# Patient Record
Sex: FEMALE | Race: WHITE | Employment: FULL TIME | URBAN - METROPOLITAN AREA
[De-identification: names, ages, dates, MRNs, and addresses within clinical notes are randomized per-mention and may not be internally consistent; named-entity substitution may affect disease eponyms.]

---

## 2022-05-12 ENCOUNTER — HOSPITAL ENCOUNTER (OUTPATIENT)
Dept: ULTRASOUND IMAGING | Age: 37
Discharge: HOME | End: 2022-05-12
Payer: COMMERCIAL

## 2022-05-12 ENCOUNTER — HOSPITAL ENCOUNTER (OUTPATIENT)
Dept: MAMMOGRAPHY | Age: 37
Discharge: HOME | End: 2022-05-12
Payer: COMMERCIAL

## 2022-05-12 VITALS — WEIGHT: 143 LBS | HEIGHT: 64 IN | BODY MASS INDEX: 24.41 KG/M2

## 2022-05-12 DIAGNOSIS — R92.8 ABNORMAL MAMMOGRAM OF RIGHT BREAST: ICD-10-CM

## 2022-05-12 DIAGNOSIS — N63.0 BREAST MASS: ICD-10-CM

## 2022-05-12 DIAGNOSIS — N63.0 BREAST LUMP: ICD-10-CM

## 2022-05-12 PROCEDURE — 88360 TUMOR IMMUNOHISTOCHEM/MANUAL: CPT

## 2022-05-12 PROCEDURE — 88342 IMHCHEM/IMCYTCHM 1ST ANTB: CPT

## 2022-05-12 PROCEDURE — 2709999900 US BREAST BIOPSY W LOC DEVICE 1ST LESION RIGHT

## 2022-05-12 PROCEDURE — 88305 TISSUE EXAM BY PATHOLOGIST: CPT

## 2022-05-12 PROCEDURE — 76642 ULTRASOUND BREAST LIMITED: CPT

## 2022-05-12 PROCEDURE — 38505 NEEDLE BIOPSY LYMPH NODES: CPT

## 2022-05-12 PROCEDURE — G0279 TOMOSYNTHESIS, MAMMO: HCPCS

## 2022-05-12 PROCEDURE — 77065 DX MAMMO INCL CAD UNI: CPT

## 2022-05-20 ENCOUNTER — HOSPITAL ENCOUNTER (OUTPATIENT)
Dept: NUCLEAR MEDICINE | Age: 37
Discharge: HOME | End: 2022-05-20
Payer: COMMERCIAL

## 2022-05-20 ENCOUNTER — HOSPITAL ENCOUNTER (OUTPATIENT)
Dept: MRI IMAGING | Age: 37
Discharge: HOME | End: 2022-05-20
Payer: COMMERCIAL

## 2022-05-20 ENCOUNTER — HOSPITAL ENCOUNTER (OUTPATIENT)
Dept: CT IMAGING | Age: 37
Discharge: HOME | End: 2022-05-20
Payer: COMMERCIAL

## 2022-05-20 DIAGNOSIS — C77.3 SECONDARY MALIGNANT NEOPLASM OF AXILLARY LYMPH NODES (HCC): ICD-10-CM

## 2022-05-20 DIAGNOSIS — C50.411 MALIGNANT NEOPLASM OF UPPER-OUTER QUADRANT OF RIGHT BREAST IN FEMALE, ESTROGEN RECEPTOR POSITIVE (HCC): ICD-10-CM

## 2022-05-20 DIAGNOSIS — C50.411 CARCINOMA OF UPPER-OUTER QUADRANT OF FEMALE BREAST, RIGHT (HCC): ICD-10-CM

## 2022-05-20 DIAGNOSIS — Z17.0 MALIGNANT NEOPLASM OF UPPER-OUTER QUADRANT OF RIGHT BREAST IN FEMALE, ESTROGEN RECEPTOR POSITIVE (HCC): ICD-10-CM

## 2022-05-20 PROCEDURE — A9503 TC99M MEDRONATE: HCPCS | Performed by: SURGERY

## 2022-05-20 PROCEDURE — 77049 MRI BREAST C-+ W/CAD BI: CPT

## 2022-05-20 PROCEDURE — 74177 CT ABD & PELVIS W/CONTRAST: CPT

## 2022-05-20 PROCEDURE — 6360000004 HC RX CONTRAST MEDICATION: Performed by: SURGERY

## 2022-05-20 PROCEDURE — 3430000000 HC RX DIAGNOSTIC RADIOPHARMACEUTICAL: Performed by: SURGERY

## 2022-05-20 PROCEDURE — 78306 BONE IMAGING WHOLE BODY: CPT

## 2022-05-20 PROCEDURE — A9579 GAD-BASE MR CONTRAST NOS,1ML: HCPCS | Performed by: SURGERY

## 2022-05-20 RX ORDER — TC 99M MEDRONATE 20 MG/10ML
25 INJECTION, POWDER, LYOPHILIZED, FOR SOLUTION INTRAVENOUS
Status: COMPLETED | OUTPATIENT
Start: 2022-05-20 | End: 2022-05-20

## 2022-05-20 RX ADMIN — TC 99M MEDRONATE 25.8 MILLICURIE: 20 INJECTION, POWDER, LYOPHILIZED, FOR SOLUTION INTRAVENOUS at 09:48

## 2022-05-20 RX ADMIN — IOPAMIDOL 75 ML: 755 INJECTION, SOLUTION INTRAVENOUS at 10:02

## 2022-05-20 RX ADMIN — GADOTERIDOL 13 ML: 279.3 INJECTION, SOLUTION INTRAVENOUS at 11:55

## 2022-05-24 ENCOUNTER — TELEPHONE (OUTPATIENT)
Dept: WOMENS IMAGING | Age: 37
End: 2022-05-24

## 2022-05-24 NOTE — TELEPHONE ENCOUNTER
[place]    [facility], [address] [place] [address]   Phone: [phone]         ULTRASOUND BIOPSY EDUCATION    NAME:  Chucky Olguin OF BIRTH:  1985   MEDICAL RECORD NUMBER:  [id number]   TODAY'S DATE:  5/24/2022    Referring Physician: Dr. Alex Hoffman    Procedure: U/S Core Bx    Left Breast    Date of biopsy: 5/31/22    Patient taking blood thinners: no    Medicine allergies: no    Special Instructions: Second look us prior to biopsy. Biopsy order form faxed to referring MD.          What is an Ultrasound Guided Breast Biopsy? Ultrasound guided breast biopsy is a test that uses ultrasound to find an area of your breast where a tissue sample will be taken. The sample is then looked at under a microscope to check for signs of breast cancer. Why is it done? An Ultrasound biopsy is usually done to check for cancer in a lump or cyst found during a mammogram or ultrasound. Preparing for the test?     * Take your medications as prescribed    You may eat and drink fluids before the test    Take a shower the evening or morning before the biopsy. What happens before the test?  Images are taken to find the exact site to be biopsied.   Your skin is washed with an alcohol prep.   You will be given an injection of medication to numb your breast.   What happens during the test?     Once your breast is numb, a small cut (incision) is made.   Using the imaging, the doctor will guide the needle into the biopsy area.   A sample of breast tissue is taken through the needle.   A small \"Clip\" or Marker is inserted into your breast to sebastián the biopsy site.   The needle is removed and pressure put on the needle site to stop any bleeding.   A bandage will be placed over the site.   A post mammogram picture will be taken to document the clip placement. How long does the test take? Approximately 60 minutes.   Most of the time is spent finding the area for the biopsy. What are the risks?   Bleeding: You may have some bleeding which can cause bruising, swelling,    or a bleeding under your skin.   Infection:  Signs of infection are redness, swelling, heat, or increasing pain    at the biopsy Site.   Sample size not adequate: This would require repeating the biopsy. What happens after the test?    The nurse will review your post biopsy instructions which include:         Placing an ice pack in your bra.    Wearing a firm fitting bra.    You may use Tylenol (Acetaminiphen) for discomfort. Lab results take about 2-3 business days. The Nurse Navigator or your doctor will call you with the results. Ultrasound Breast Biopsy:     (Please arrive 15 minutes early)                                                 [x] Blood thinner history reviewed with patient    [x] Take all your other medications on your normal routine schedule. [x] You may eat and drink as normal before your biopsy. [x] You may drive yourself. [x] No heavy lifting or exercise for 48 hours after the biopsy. [x] Printed Pre Ultrasound Biopsy Instructions were provided, reviewed with the patient and all questions were answered. The Breast Center's Information:   Should you experience any significant changes in your health or have questions about your care, please contact:  Patricia Marie, Breast Nurse Navigator at [facility], [facility]:  [phone]  Monday-Friday. If you need help with your care outside these hours and cannot wait until we are again available, contact your Physician or go to the hospital emergency room. [x] Patient/POA/Caregiver verbalized understanding of instructions.        Electronically signed by Patricia Marie RN on 5/24/2022 at 11:41 AM

## 2022-05-27 NOTE — PROGRESS NOTES
[facility] PRE-SURGICAL TESTING INSTRUCTIONS                                  PRIOR TO PROCEDURE DATE:        1. PLEASE FOLLOW ANY  GUIDELINES/ INSTRUCTIONS PRIOR TO YOUR PROCEDURE AS ADVISED BY YOUR SURGEON. 2. Arrange for someone to drive you home and be with you for the first 24 hours after discharge for your safety after your procedure for which you received sedation. Ensure it is someone we can share information with regarding your discharge. 3. You must contact your surgeon for instructions IF:   You are taking any blood thinners, aspirin, anti-inflammatory or vitamin E.   There is a change in your physical condition such as a cold, fever, rash, cuts, sores or any other infection, especially near your surgical site. 4. Do not drink alcohol the day before or day of your procedure. 5. A Pre-op History and Physical for surgery MUST be completed by your Physician or Urgent Care within 30 days of your procedure date. Please bring a copy with you on the day of your procedure and along with any other testing performed. THE DAY OF YOUR PROCEDURE:  1. Follow instructions for ARRIVAL TIME as DIRECTED BY YOUR SURGEON. 2. Enter the MAIN entrance from [address] [place] [address] and follow the signs to the free [employer] or [employer] parking (offered free of charge 6am-5pm). 3. Enter the Main Entrance of the hospital (do not enter from the lower level of the parking garage). Upon entrance, check in with the  at the main desk on your left. If no one is available at the desk, proceed into the [facility] Waiting Room and go through the door directly into the [facility]. There is a Check-in desk ACROSS from Room 5 (marked with a sign hanging from the ceiling). The phone number for the surgery center is [phone]. 4. Please call [phone] option #2 option #2 if you have not been preregistered yet.   On the day of your procedure bring your insurance card and photo ID. You will be registered at your bedside once brought back to your room. 5. DO NOT EAT ANYTHING eight hours prior to your arrival for surgery. May have 8 ounces of water 4 hours prior to your arrival for surgery. NOTE: ALL Gastric, Bariatric and Bowel surgery patients MUST follow their surgeon's instructions. 6. MEDICATIONS    Take the following medications with a SMALL sip of water: none   Bariatric patient's call surgeon if on diabetic medications as some need to be stopped 1 week preop   Use your usual dose of inhalers the morning of surgery. BRING your rescue inhaler with you to hospital.    Anesthesia does NOT want you to take insulin the morning of surgery. They will control your blood sugar while you are at the hospital. Please contact your ordering physician for instructions regarding your insulin the night before your procedure. If you have an insulin pump, please keep it set on basal rate. 7. Do not swallow water when brushing teeth. No gum, candy, mints or ice chips. Refrain from smoking or at least decrease the amount. 8. Dress in loose, comfortable clothing appropriate for redressing after your procedure. Do not wear jewelry (including body piercings), make-up (especially NO eye make-up), fingernail polish (NO toenail polish if foot/leg surgery), lotion, powders or metal hairclips. 9. Dentures, glasses, or contacts will need to be removed before your procedure. Bring cases for your glasses, contacts, dentures, or hearing aids to protect them while you are in surgery. 10. If you use a CPAP, please bring it with you on the day of your procedure. 11. We recommend that valuable personal  belongings such as cash, cell phones, e-tablets or jewelry, be left at home during your stay. The hospital will not be responsible for valuables that are not secured in the hospital safe.  However, if your insurance requires a co-pay, you may want to bring a method of payment, i.e. Check or credit card, if you wish to pay your co-pay the day of surgery. 12. If you are to stay overnight, you may bring a bag with personal items. Please have any large items you may need brought in by your family after your arrival to your hospital room. 15. If you have a Living Will or Durable Power of , please bring a copy on the day of your procedure. 15. With your permission, one family member may accompany you while you are being prepared for surgery. Once you are ready, additional family members may join you. HOW WE KEEP YOU SAFE and WORK TO PREVENT SURGICAL SITE INFECTIONS:  1. Health care workers should always check your ID bracelet to verify your name and birth date. You will be asked many times to state your name, date of birth, and allergies. 2. Health care workers should always clean their hands with soap or alcohol gel before providing care to you. It is okay to ask anyone if they cleaned their hands before they touch you. 3. You will be actively involved in verifying the type of procedure you are having and ensuring the correct surgical site. This will be confirmed multiple times prior to your procedure. Do NOT sebastián your surgery site UNLESS instructed to by your surgeon. 4. Do not shave or wax for 72 hours prior to procedure near your operative site. Shaving with a razor can irritate your skin and make it easier to develop an infection. On the day of your procedure, any hair that needs to be removed near the surgical site will be clipped by a healthcare worker using a special clippers designed to avoid skin irritation. 5. When you are in the operating room, your surgical site will be cleansed with a special soap, and in most cases, you will be given an antibiotic before the surgery begins. What to expect AFTER YOUR PROCEDURE:  1. Immediately following your procedure, your will be taken to the PACU for the first phase of your recovery.   Your nurse will help you recover from any potential side effects of anesthesia, such as extreme drowsiness, changes in your vital signs or breathing patterns. Nausea, headache, muscle aches, or sore throat may also occur after anesthesia. Your nurse will help you manage these potential side effects. 2. For comfort and safety, arrange to have someone at home with you for the first 24 hours after discharge. 3. You and your family will be given written instructions about your diet, activity, dressing care, medications, and return visits. 4. Once at home, should issues with nausea, pain, or bleeding occur, or should you notice any signs of infection, you should call your surgeon. 5. Always clean your hands before and after caring for your wound. Do not let your family touch your surgery site without cleaning their hands. 6. Narcotic pain medications can cause significant constipation. You may want to add a stool softener to your postoperative medication schedule or speak to your surgeon on how best to manage this SIDE EFFECT. SPECIAL INSTRUCTIONS     Thank you for allowing us to care for you. We strive to exceed your expectations in the delivery of care and service provided to you and your family. If you need to contact the [place] [address] staff for any reason, please call us at [phone]    Instructions reviewed with patient during preadmission testing phone interview.   Severo Bates RN.5/27/2022 .1:44 PM      ADDITIONAL EDUCATIONAL INFORMATION REVIEWED PER PHONE WITH YOU AND/OR YOUR FAMILY:  No Hibiclens® Bathing Instructions   Yes Antibacterial Soap

## 2022-05-27 NOTE — PROGRESS NOTES
Place patient label inside box (if no patient label, complete below)  Name:  :  MR#:   Elda Tran / PROCEDURE  1. I (we), Roxy Irina  (Patient Name) authorize Tamara Cooley MD (Provider / Noam Brothers) and/or such assistants as may be selected by him/her, to perform the following operation/procedure(s): LEFT PORT PLACEMENT        Note: If unable to obtain consent prior to an emergent procedure, document the emergent reason in the medical record. This procedure has been explained to my (our) satisfaction and included in the explanation was:  A) The intended benefit, nature, and extent of the procedure to be performed;  B) The significant risks involved and the probability of success;  C) Alternative procedures and methods of treatment;  D) The dangers and probable consequences of such alternatives (including no procedure or treatment); E) The expected consequences of the procedure on my future health;  F) Whether other qualified individuals would be performing important surgical tasks and/or whether  would be present to advise or support the procedure. I (we) understand that there are other risks of infection and other serious complications in the pre-operative/procedural and postoperative/procedural stages of my (our) care. I (we) have asked all of the questions which I (we) thought were important in deciding whether or not to undergo treatment or diagnosis. These questions have been answered to my (our) satisfaction. I (we) understand that no assurance can be given that the procedure will be a success, and no guarantee or warranty of success has been given to me (us). 2. It has been explained to me (us) that during the course of the operation/procedure, unforeseen conditions may be revealed that necessitate extension of the original procedure(s) or different procedure(s) than those set forth in Paragraph 1.  I (we) authorize and request that the above-named physician, his/her assistants or his/her designees, perform procedures as necessary and desirable if deemed to be in my (our) best interest.     Revised 8/2/2021                                                                          Page 1 of 2                   3. I acknowledge that health care personnel may be observing this procedure for the purpose of medical education or other specified purposes as may be necessary as requested and/or approved by my (our) physician. 4. I (we) consent to the disposal by the hospital Pathologist of the removed tissue, parts or organs in accordance with hospital policy. 5. I do ____ do not ____ consent to the use of a local infiltration pain blocking agent that will be used by my provider/surgical provider to help alleviate pain during my procedure. 6. I do ____ do not ____ consent to an emergent blood transfusion in the case of a life-threatening situation that requires blood components to be administered. This consent is valid for 24 hours from the beginning of the procedure. 7. This patient does ____ or does not ____ currently have a DNR status/order. If DNR order is in place, obtain Addendum to the Surgical Consent for ALL Patients with a DNR Order to address aileen-operative status for limited intervention or DNR suspension.      8. I have read and fully understand the above Consent for Operation/Procedure and that all blanks were completed before I signed the consent.   _____________________________       _____________________      ____/____am/pm  Signature of Patient or legal representative      Printed Name / Relationship            Date / Time   ____________________________       _____________________      ____/____am/pm  Witness to Signature                                    Printed Name                    Date / Time     If patient is unable to sign or is a minor, complete the following)  Patient is a minor, ____ years of age, or unable to sign because:   ______________________________________________________________________________________________    [place] If a phone consent is obtained, consent will be documented by using two health care professionals, each affirming that the consenting party has no questions and gives consent for the procedure discussed with the physician/provider.   _____________________          ____________________       _____/_____am/pm   2nd witness to phone consent        Printed name           Date / Time    Informed Consent:  I have provided the explanation described above in section 1 to the patient and/or legal representative.  I have provided the patient and/or legal representative with an opportunity to ask any questions about the proposed operation/procedure.   ___________________________          ____________________         ____/____am/pm  Provider / Proceduralist                            Printed name            Date / Time  Revised 8/2/2021                                                                      Page 2 of 2

## 2022-05-27 NOTE — PROGRESS NOTES
5/27/22 @ 1412 Pt verbalizes understanding of PAT instructions. [facility]   Anibal Dong

## 2022-05-31 ENCOUNTER — HOSPITAL ENCOUNTER (OUTPATIENT)
Dept: WOMENS IMAGING | Age: 37
End: 2022-05-31
Payer: COMMERCIAL

## 2022-05-31 ENCOUNTER — HOSPITAL ENCOUNTER (OUTPATIENT)
Dept: WOMENS IMAGING | Age: 37
Discharge: HOME | End: 2022-05-31
Payer: COMMERCIAL

## 2022-05-31 DIAGNOSIS — R92.8 ABNORMAL MRI, BREAST: ICD-10-CM

## 2022-05-31 PROCEDURE — 76642 ULTRASOUND BREAST LIMITED: CPT

## 2022-06-03 ENCOUNTER — ANESTHESIA EVENT (OUTPATIENT)
Dept: OPERATING ROOM | Age: 37
End: 2022-06-03
Payer: COMMERCIAL

## 2022-06-03 ENCOUNTER — APPOINTMENT (OUTPATIENT)
Dept: GENERAL RADIOLOGY | Age: 37
End: 2022-06-03
Attending: SURGERY
Payer: COMMERCIAL

## 2022-06-03 ENCOUNTER — ANESTHESIA (OUTPATIENT)
Dept: OPERATING ROOM | Age: 37
End: 2022-06-03
Payer: COMMERCIAL

## 2022-06-03 ENCOUNTER — HOSPITAL ENCOUNTER (OUTPATIENT)
Age: 37
Setting detail: OUTPATIENT SURGERY
Discharge: HOME | End: 2022-06-03
Attending: SURGERY | Admitting: SURGERY
Payer: COMMERCIAL

## 2022-06-03 VITALS
WEIGHT: 140.8 LBS | RESPIRATION RATE: 14 BRPM | HEART RATE: 74 BPM | HEIGHT: 64 IN | DIASTOLIC BLOOD PRESSURE: 64 MMHG | BODY MASS INDEX: 24.04 KG/M2 | SYSTOLIC BLOOD PRESSURE: 124 MMHG | OXYGEN SATURATION: 99 % | TEMPERATURE: 98.3 F

## 2022-06-03 DIAGNOSIS — G89.18 ACUTE POST-OPERATIVE PAIN: Primary | ICD-10-CM

## 2022-06-03 PROBLEM — I87.8 POOR VENOUS ACCESS: Status: ACTIVE | Noted: 2022-06-03

## 2022-06-03 LAB — PREGNANCY, URINE: NEGATIVE

## 2022-06-03 PROCEDURE — 2580000003 HC RX 258: Performed by: SURGERY

## 2022-06-03 PROCEDURE — 77001 FLUOROGUIDE FOR VEIN DEVICE: CPT

## 2022-06-03 PROCEDURE — 3700000001 HC ADD 15 MINUTES (ANESTHESIA): Performed by: SURGERY

## 2022-06-03 PROCEDURE — 6360000002 HC RX W HCPCS: Performed by: SURGERY

## 2022-06-03 PROCEDURE — 7100000010 HC PHASE II RECOVERY - FIRST 15 MIN: Performed by: SURGERY

## 2022-06-03 PROCEDURE — 3700000000 HC ANESTHESIA ATTENDED CARE: Performed by: SURGERY

## 2022-06-03 PROCEDURE — 7100000011 HC PHASE II RECOVERY - ADDTL 15 MIN: Performed by: SURGERY

## 2022-06-03 PROCEDURE — 3600000003 HC SURGERY LEVEL 3 BASE: Performed by: SURGERY

## 2022-06-03 PROCEDURE — 3600000013 HC SURGERY LEVEL 3 ADDTL 15MIN: Performed by: SURGERY

## 2022-06-03 PROCEDURE — 7100000000 HC PACU RECOVERY - FIRST 15 MIN: Performed by: SURGERY

## 2022-06-03 PROCEDURE — 84703 CHORIONIC GONADOTROPIN ASSAY: CPT

## 2022-06-03 PROCEDURE — C1788 PORT, INDWELLING, IMP: HCPCS | Performed by: SURGERY

## 2022-06-03 PROCEDURE — 2500000003 HC RX 250 WO HCPCS: Performed by: NURSE ANESTHETIST, CERTIFIED REGISTERED

## 2022-06-03 PROCEDURE — 6360000002 HC RX W HCPCS: Performed by: NURSE ANESTHETIST, CERTIFIED REGISTERED

## 2022-06-03 PROCEDURE — 2580000003 HC RX 258: Performed by: ANESTHESIOLOGY

## 2022-06-03 PROCEDURE — 2500000003 HC RX 250 WO HCPCS: Performed by: SURGERY

## 2022-06-03 PROCEDURE — 7100000001 HC PACU RECOVERY - ADDTL 15 MIN: Performed by: SURGERY

## 2022-06-03 PROCEDURE — 2709999900 HC NON-CHARGEABLE SUPPLY: Performed by: SURGERY

## 2022-06-03 PROCEDURE — A4217 STERILE WATER/SALINE, 500 ML: HCPCS | Performed by: SURGERY

## 2022-06-03 PROCEDURE — 71045 X-RAY EXAM CHEST 1 VIEW: CPT

## 2022-06-03 DEVICE — PORT INFUS 6FR SLIM POLYUR ATTCH OPN END SGL LUMN VEN CATH: Type: IMPLANTABLE DEVICE | Site: CHEST  WALL | Status: FUNCTIONAL

## 2022-06-03 RX ORDER — LABETALOL HYDROCHLORIDE 5 MG/ML
10 INJECTION, SOLUTION INTRAVENOUS
Status: DISCONTINUED | OUTPATIENT
Start: 2022-06-03 | End: 2022-06-03 | Stop reason: HOSPADM

## 2022-06-03 RX ORDER — SODIUM CHLORIDE 0.9 % (FLUSH) 0.9 %
5-40 SYRINGE (ML) INJECTION EVERY 12 HOURS SCHEDULED
Status: DISCONTINUED | OUTPATIENT
Start: 2022-06-03 | End: 2022-06-03 | Stop reason: HOSPADM

## 2022-06-03 RX ORDER — HEPARIN SODIUM (PORCINE) LOCK FLUSH IV SOLN 100 UNIT/ML 100 UNIT/ML
SOLUTION INTRAVENOUS PRN
Status: DISCONTINUED | OUTPATIENT
Start: 2022-06-03 | End: 2022-06-03 | Stop reason: ALTCHOICE

## 2022-06-03 RX ORDER — SODIUM CHLORIDE 9 MG/ML
INJECTION, SOLUTION INTRAVENOUS PRN
Status: DISCONTINUED | OUTPATIENT
Start: 2022-06-03 | End: 2022-06-03 | Stop reason: HOSPADM

## 2022-06-03 RX ORDER — GLYCOPYRROLATE 0.2 MG/ML
INJECTION INTRAMUSCULAR; INTRAVENOUS PRN
Status: DISCONTINUED | OUTPATIENT
Start: 2022-06-03 | End: 2022-06-03 | Stop reason: SDUPTHER

## 2022-06-03 RX ORDER — FENTANYL CITRATE 50 UG/ML
INJECTION, SOLUTION INTRAMUSCULAR; INTRAVENOUS PRN
Status: DISCONTINUED | OUTPATIENT
Start: 2022-06-03 | End: 2022-06-03 | Stop reason: SDUPTHER

## 2022-06-03 RX ORDER — ONDANSETRON 2 MG/ML
4 INJECTION INTRAMUSCULAR; INTRAVENOUS
Status: DISCONTINUED | OUTPATIENT
Start: 2022-06-03 | End: 2022-06-03 | Stop reason: HOSPADM

## 2022-06-03 RX ORDER — OXYCODONE HYDROCHLORIDE 5 MG/1
5 TABLET ORAL
Status: DISCONTINUED | OUTPATIENT
Start: 2022-06-03 | End: 2022-06-03 | Stop reason: HOSPADM

## 2022-06-03 RX ORDER — SODIUM CHLORIDE, SODIUM LACTATE, POTASSIUM CHLORIDE, CALCIUM CHLORIDE 600; 310; 30; 20 MG/100ML; MG/100ML; MG/100ML; MG/100ML
INJECTION, SOLUTION INTRAVENOUS CONTINUOUS
Status: DISCONTINUED | OUTPATIENT
Start: 2022-06-03 | End: 2022-06-03 | Stop reason: HOSPADM

## 2022-06-03 RX ORDER — MEPERIDINE HYDROCHLORIDE 25 MG/ML
12.5 INJECTION INTRAMUSCULAR; INTRAVENOUS; SUBCUTANEOUS EVERY 5 MIN PRN
Status: DISCONTINUED | OUTPATIENT
Start: 2022-06-03 | End: 2022-06-03 | Stop reason: HOSPADM

## 2022-06-03 RX ORDER — PROCHLORPERAZINE EDISYLATE 5 MG/ML
5 INJECTION INTRAMUSCULAR; INTRAVENOUS
Status: DISCONTINUED | OUTPATIENT
Start: 2022-06-03 | End: 2022-06-03 | Stop reason: HOSPADM

## 2022-06-03 RX ORDER — SODIUM CHLORIDE 0.9 % (FLUSH) 0.9 %
5-40 SYRINGE (ML) INJECTION PRN
Status: DISCONTINUED | OUTPATIENT
Start: 2022-06-03 | End: 2022-06-03 | Stop reason: HOSPADM

## 2022-06-03 RX ORDER — HYDROCODONE BITARTRATE AND ACETAMINOPHEN 5; 325 MG/1; MG/1
1 TABLET ORAL EVERY 6 HOURS PRN
Qty: 12 TABLET | Refills: 0 | Status: SHIPPED | OUTPATIENT
Start: 2022-06-03 | End: 2022-06-06

## 2022-06-03 RX ORDER — LIDOCAINE HYDROCHLORIDE 10 MG/ML
1 INJECTION, SOLUTION EPIDURAL; INFILTRATION; INTRACAUDAL; PERINEURAL
Status: DISCONTINUED | OUTPATIENT
Start: 2022-06-03 | End: 2022-06-03 | Stop reason: HOSPADM

## 2022-06-03 RX ORDER — PROPOFOL 10 MG/ML
INJECTION, EMULSION INTRAVENOUS CONTINUOUS PRN
Status: DISCONTINUED | OUTPATIENT
Start: 2022-06-03 | End: 2022-06-03 | Stop reason: SDUPTHER

## 2022-06-03 RX ORDER — MAGNESIUM HYDROXIDE 1200 MG/15ML
LIQUID ORAL CONTINUOUS PRN
Status: COMPLETED | OUTPATIENT
Start: 2022-06-03 | End: 2022-06-03

## 2022-06-03 RX ORDER — HYDRALAZINE HYDROCHLORIDE 20 MG/ML
10 INJECTION INTRAMUSCULAR; INTRAVENOUS
Status: DISCONTINUED | OUTPATIENT
Start: 2022-06-03 | End: 2022-06-03 | Stop reason: HOSPADM

## 2022-06-03 RX ORDER — MIDAZOLAM HYDROCHLORIDE 1 MG/ML
INJECTION INTRAMUSCULAR; INTRAVENOUS PRN
Status: DISCONTINUED | OUTPATIENT
Start: 2022-06-03 | End: 2022-06-03 | Stop reason: SDUPTHER

## 2022-06-03 RX ORDER — FENTANYL CITRATE 50 UG/ML
25 INJECTION, SOLUTION INTRAMUSCULAR; INTRAVENOUS EVERY 5 MIN PRN
Status: DISCONTINUED | OUTPATIENT
Start: 2022-06-03 | End: 2022-06-03 | Stop reason: HOSPADM

## 2022-06-03 RX ADMIN — CEFAZOLIN 2000 MG: 2 INJECTION, POWDER, FOR SOLUTION INTRAMUSCULAR; INTRAVENOUS at 11:07

## 2022-06-03 RX ADMIN — PROPOFOL 100 MCG/KG/MIN: 10 INJECTION, EMULSION INTRAVENOUS at 11:03

## 2022-06-03 RX ADMIN — GLYCOPYRROLATE 0.2 MG: 0.2 INJECTION INTRAMUSCULAR; INTRAVENOUS at 11:12

## 2022-06-03 RX ADMIN — MIDAZOLAM HYDROCHLORIDE 2 MG: 2 INJECTION, SOLUTION INTRAMUSCULAR; INTRAVENOUS at 10:54

## 2022-06-03 RX ADMIN — FENTANYL CITRATE 100 MCG: 50 INJECTION, SOLUTION INTRAMUSCULAR; INTRAVENOUS at 10:59

## 2022-06-03 RX ADMIN — PHENYLEPHRINE HYDROCHLORIDE 100 MCG: 10 INJECTION, SOLUTION INTRAMUSCULAR; INTRAVENOUS; SUBCUTANEOUS at 11:12

## 2022-06-03 RX ADMIN — SODIUM CHLORIDE, POTASSIUM CHLORIDE, SODIUM LACTATE AND CALCIUM CHLORIDE: 600; 310; 30; 20 INJECTION, SOLUTION INTRAVENOUS at 09:14

## 2022-06-03 ASSESSMENT — PAIN - FUNCTIONAL ASSESSMENT: PAIN_FUNCTIONAL_ASSESSMENT: 0-10

## 2022-06-03 ASSESSMENT — PAIN SCALES - GENERAL
PAINLEVEL_OUTOF10: 0

## 2022-06-03 NOTE — PROGRESS NOTES
Consents signed x 2 and procedures for the day explained to pt. Her  is nearby in their camper bus and has his phone. Dr. Anup Victor called and asked for a fluid bolus to be given after her IV start so it is infusing well.

## 2022-06-03 NOTE — PROGRESS NOTES
PACU Transfer to [facility]    Vitals:    06/03/22 1300   BP: 124/74   Pulse: 69   Resp: 18   Temp: 98 °F (36.7 °C)   SpO2: 100%         Intake/Output Summary (Last 24 hours) at 6/3/2022 1317  Last data filed at 6/3/2022 1308  Gross per 24 hour   Intake 516.41 ml   Output 10 ml   Net 506.41 ml       Pain assessment:  none  Pain Level: 0      Patient has all belongings at discharge from PACU. PACU RN called and updated patient's family.    Patient transferred to care of Lincoln Soto RN.    6/3/2022 1:17 PM

## 2022-06-03 NOTE — ANESTHESIA POSTPROCEDURE EVALUATION
Department of Anesthesiology  Postprocedure Note    Patient: Issa Newsome  MRN: [id number]  [place]: 1985  Date of evaluation: 6/3/2022  Time:  12:22 PM     Procedure Summary     Date: 06/03/22 Room / Location: [address] [place] [address] [address] [place] [address] / [facility]    Anesthesia Start: 8180 Anesthesia Stop: 0927    Procedure: LEFT SUBCLAVIAN PORT PLACEMENT (Left Chest) Diagnosis:       Poor venous access      (Poor venous access [I87.8])    Surgeons: Chinmay Patterson MD Responsible Provider: Suzanne Sim MD    Anesthesia Type: MAC ASA Status: 1          Anesthesia Type: No value filed. Merlyn Phase I: Merlyn Score: 10    Merlyn Phase II:      Last vitals: Reviewed and per EMR flowsheets.        Anesthesia Post Evaluation    Patient location during evaluation: PACU  Patient participation: complete - patient participated  Level of consciousness: awake and alert  Airway patency: patent  Nausea & Vomiting: no nausea and no vomiting  Complications: no  Cardiovascular status: hemodynamically stable  Respiratory status: acceptable  Hydration status: euvolemic  Multimodal analgesia pain management approach

## 2022-06-03 NOTE — PROGRESS NOTES
Ambulatory Surgery/Procedure Discharge Note    Vitals:    06/03/22 1320   BP: 124/64   Pulse: 74   Resp: 14   Temp: 98.3 °F (36.8 °C)   SpO2: 99%       In: 516.4 [P.O.:125; I.V.:341.4]  Out: 10 pt declined offer of drink or snack; Pt urinated X 1 in toilet    Restroom use offered before discharge. Yes -- voided X 1    Pain assessment:  none  Pain Level: 0    Pt returned to [facility] from PACU s/p left port placement. Pt fully alert; speech clear; breathing easily on RA; denies any pain or nausea. Left chest surgical site closed with surgical glue, and same is clean, dry and intact. This RN provided ice pack for pt to use. Pt declined any offer of snack or drink. Voided before discharge. Discharge instructions discussed with pt at bedside and with  via telephone, and both verbalized understanding, to include information regarding pt having a POWER PORT. IV removed, and dressing applied. Patient discharged to home/self care. Patient discharged via wheel chair by RN's to waiting family/S. O. in very large bus.        6/3/2022 1:56 PM

## 2022-06-03 NOTE — PROGRESS NOTES
Personally called and updated patient's  at [phone] on patient's status as well as plan moving forward for discharge.  is parked in the [employer] lot.

## 2022-06-03 NOTE — BRIEF OP NOTE
Brief Postoperative Note      Patient: Trell Cheng  YOB: 1985  MRN: [id number]    Date of Procedure: 6/3/2022    Pre-Op Diagnosis: Poor venous access [I87.8]    Post-Op Diagnosis: Same       Procedure(s):  LEFT SUBCLAVIAN PORT PLACEMENT    Surgeon(s):  Nohemy Peacock MD    Assistant:  Surgical Assistant: Rasheeda Wilson    Anesthesia: Monitor Anesthesia Care    Estimated Blood Loss (mL): less than 50     Complications: None    Specimens:   * No specimens in log *    Implants:  * No implants in log *      Drains: * No LDAs found *    Findings: tip in distal SVC    Electronically signed by Nohemy Peacock MD on 6/3/2022 at 11:47 AM

## 2022-06-03 NOTE — ANESTHESIA PRE PROCEDURE
Department of Anesthesiology  Preprocedure Note       Name:  Trell Cheng   Age:  29 y.o.  :  1985                                          MRN:  [id number]         Date:  6/3/2022      Surgeon: Jennyfer Lagos):  Nohemy Peacock MD    Procedure: Procedure(s):  LEFT PORT PLACEMENT    Medications prior to admission:   Prior to Admission medications    Not on File       Current medications:    Current Facility-Administered Medications   Medication Dose Route Frequency Provider Last Rate Last Admin    ceFAZolin (ANCEF) 2,000 mg in sodium chloride 0.9 % 50 mL IVPB (mini-bag)  2,000 mg IntraVENous Once Nohemy Peacock MD        lactated ringers infusion   IntraVENous Continuous Nohemy Peacock MD        lidocaine PF 1 % injection 1 mL  1 mL IntraDERmal Once PRN Shona Barrientos MD        lactated ringers infusion   IntraVENous Continuous Shona Barrientos  mL/hr at 22 0914 New Bag at 22 0914    sodium chloride flush 0.9 % injection 5-40 mL  5-40 mL IntraVENous 2 times per day Shona Barrientos MD        sodium chloride flush 0.9 % injection 5-40 mL  5-40 mL IntraVENous PRN Shona Barrientos MD        0.9 % sodium chloride infusion   IntraVENous PRN Shona Barrientos MD           Allergies:  No Known Allergies    Problem List:    Patient Active Problem List   Diagnosis Code    Poor venous access I87.8       Past Medical History:        Diagnosis Date    Cancer ([address] 2022    right breast       Past Surgical History:        Procedure Laterality Date    CHOLECYSTECTOMY  10-    US BREAST NEEDLE BIOPSY RIGHT Right 2022    US BREAST NEEDLE BIOPSY RIGHT 2022 [facility] ULTR[facility]    US LYMPH NODE BIOPSY  2022    US LYMPH NODE BIOPSY 2022 [facility] ULTRASOUND       Social History:    Social History     Tobacco Use    Smoking status: Never Smoker    Smokeless tobacco: Never Used   Substance Use Topics    Alcohol use:  No                                Counseling given: Not Answered      Vital Signs (Current):   Vitals:    05/27/22 1340 06/03/22 0842   BP:  137/72   Pulse:  86   Resp:  15   Temp:  98 °F (36.7 °C)   TempSrc:  Temporal   SpO2:  100%   Weight: 143 lb (64.9 kg) 140 lb 12.8 oz (63.9 kg)   Height: 5' 4\" (1.626 m) 5' 4\" (1.626 m)                                              BP Readings from Last 3 Encounters:   06/03/22 137/72       NPO Status: Time of last liquid consumption: 2030                        Time of last solid consumption: 2020                        Date of last liquid consumption: 06/02/22                        Date of last solid food consumption: 06/02/22    BMI:   Wt Readings from Last 3 Encounters:   06/03/22 140 lb 12.8 oz (63.9 kg)   05/20/22 143 lb (64.9 kg)   05/12/22 143 lb (64.9 kg)     Body mass index is 24.17 kg/m². CBC: No results found for: WBC, RBC, HGB, HCT, MCV, RDW, PLT    CMP: No results found for: NA, K, CL, CO2, BUN, CREATININE, GFRAA, AGRATIO, LABGLOM, GLUCOSE, GLU, PROT, CALCIUM, BILITOT, ALKPHOS, AST, ALT    POC Tests: No results for input(s): POCGLU, POCNA, POCK, POCCL, POCBUN, POCHEMO, POCHCT in the last 72 hours.     Coags: No results found for: PROTIME, INR, APTT    HCG (If Applicable):   Lab Results   Component Value Date    PREGTESTUR Negative 06/03/2022        ABGs: No results found for: PHART, PO2ART, XRX8CKF, ERH6YAQ, BEART, M2QVMJFR     Type & Screen (If Applicable):  No results found for: LABABO, LABRH    Drug/Infectious Status (If Applicable):  No results found for: HIV, HEPCAB    COVID-19 Screening (If Applicable): No results found for: COVID19        Anesthesia Evaluation  Patient summary reviewed and Nursing notes reviewed no history of anesthetic complications:   Airway: Mallampati: I  TM distance: >3 FB   Neck ROM: full  Mouth opening: > = 3 FB   Dental: normal exam         Pulmonary:Negative Pulmonary ROS and normal exam                               Cardiovascular:Negative CV ROS Neuro/Psych:   Negative Neuro/Psych ROS              GI/Hepatic/Renal: Neg GI/Hepatic/Renal ROS            Endo/Other: Negative Endo/Other ROS                    Abdominal:             Vascular: negative vascular ROS. Other Findings:           Anesthesia Plan      MAC     ASA 1       Induction: intravenous. Anesthetic plan and risks discussed with patient. Plan discussed with CRNA.     Attending anesthesiologist reviewed and agrees with Preprocedure content                Joann Miller MD   6/3/2022

## 2022-06-03 NOTE — H&P
Last H & P within the last 30 days. DIAGNOSIS:   Poor venous access [I87.8]    Procedure(s):  LEFT PORT PLACEMENT     History obtained from: Patient interview and EHR     HISTORY OF PRESENT ILLNESS:   Patient is a 29 y.o. female with breast cancer, and presents today for port placement in anticipation of planned treatment. Illness screening:  Patient denies recent fever, chills, worsening cough, or known sick exposure     Past Medical History:        Diagnosis Date    Cancer [facility])      Past Surgical History:        Procedure Laterality Date    CHOLECYSTECTOMY  10-    US BREAST NEEDLE BIOPSY RIGHT Right 5/12/2022    US BREAST NEEDLE BIOPSY RIGHT 5/12/2022 [address] [facility] ULTRASOUND    US LYMPH NODE BIOPSY  5/12/2022    US LYMPH NODE BIOPSY 5/12/2022 [address] [facility] ULTRASOUND       Medications Prior to Admission:      None. Allergies:  Patient has no known allergies. PHYSICAL EXAM:      /72   Pulse 86   Temp 98 °F (36.7 °C) (Temporal)   Resp 15   Ht 5' 4\" (1.626 m)   Wt 140 lb 12.8 oz (63.9 kg)   SpO2 100%   BMI 24.17 kg/m²      Airway:  Airway patent with no audible stridor. Heart:  Regular rate and rhythm. No murmur noted. Lungs:  No increased work of breathing, good air exchange, clear to auscultation bilaterally, no crackles or wheezing. Abdomen:  Soft, non-distended, non-tender, no rebound tenderness or guarding, and no masses palpated. ASSESSMENT AND PLAN     Patient is a 29 y.o. female with above specified procedure planned. 1.  The patients history and physical was obtained and signed off by the pre-admission testing department. Patient seen and focused exam done today- no new changes since last physical exam on 5/27/2022.    2.  Access to ancillary services are available per request of the provider.     EMMA Mccoy - CNP     6/3/2022

## 2022-06-03 NOTE — PROGRESS NOTES
Patient arrived to PACU post LEFT SUBCLAVIAN PORT PLACEMENT - Left withDr. Latesha Fu. DELMY on arrival. CRNA gave PACU RN report at bedside, stating no complications. Surgical site clean, dry and intact. Patient shows no signs of pain at this time. Will continue to monitor.

## 2022-06-04 NOTE — OP NOTE
[facility] [address], [address]                                OPERATIVE REPORT    PATIENT NAME: Bowen Gutierrez                       :        1985  MED REC NO:   [id number]                          ROOM:  ACCOUNT NO:   [de-identified]                           ADMIT DATE: 2022  PROVIDER:     Regina Hernandez MD    DATE OF PROCEDURE:  2022    PREOPERATIVE DIAGNOSIS:  Poor venous access. POSTOPERATIVE DIAGNOSIS:  Poor venous access. PROCEDURE:  Placement of left subclavian port, intraoperative fluoroscopy. SURGEON:  Regina Hernandez MD    ASSISTANT:  Tay James. BLOOD LOSS:  Less than 50 mL. COMPLICATIONS:  None. INDICATIONS:  The patient is a 81-year-old woman with a locally advanced  right breast cancer, who requires placement of a port for anticipated  chemotherapy. Prior to obtaining informed consent, the patient was  advised regarding the risk of pneumothorax. DESCRIPTION OF PROCEDURE:  The patient was taken to the operating room,  placed in the supine position. Shoulder roll was placed along the  spine. After administration of IV sedation, the patient's left chest  was prepped and draped in the usual sterile fashion. Field block was  performed using 2% lidocaine and 0.5% Marcaine mixed in equal parts. We  accessed the left subclavian vein on the first attempt and passed a  guidewire using Seldinger technique. Intraoperative fluoroscopy was  used to verify the location of the wire within the superior vena cava. We created a transverse incision at the entry point of the wire and  extended it through the subcutaneous tissues where we created a  subcutaneous pocket. Two 2-0 silk sutures were passed through the port  and the pectoralis fascia and left untied. A dilator sheath was passed  over the wire. The wire and the dilator were then withdrawn.   We passed  a catheter through the introducer sheath which was then torn away. After positioning the tip of the catheter in the superior vena cava, we  cut to the appropriate length and connected it to the port. The port  was secured in the subcutaneous pocket using two 2-0 silk sutures. The  incision was closed with interrupted deep dermal sutures and a 4-0  Monocryl subcuticular skin closure. The port was accessed with good  venous return and flushed with 20 mL of saline and 5 mL of heparin  flush. The port placed was a ClearView PowerPort 6-[place], reference  [id number], lot # [id number]. Postoperative chest x-ray confirmed the  presence of the catheter within the superior vena cava and the absence  of pneumothorax.         Summer Ortiz MD    D: 06/03/2022 21:02:27       T: 06/03/2022 21:06:01     [email or web address]  Job#: [id number]     Doc#: [id number]    CC:

## 2022-06-09 ENCOUNTER — HOSPITAL ENCOUNTER (OUTPATIENT)
Dept: NON INVASIVE DIAGNOSTICS | Age: 37
Discharge: HOME | End: 2022-06-09
Payer: COMMERCIAL

## 2022-06-09 DIAGNOSIS — Z51.11 MAINTENANCE CHEMOTHERAPY: ICD-10-CM

## 2022-06-09 LAB
LV EF: 63 %
LVEF MODALITY: NORMAL

## 2022-06-09 PROCEDURE — 93306 TTE W/DOPPLER COMPLETE: CPT

## 2022-09-29 ENCOUNTER — HOSPITAL ENCOUNTER (OUTPATIENT)
Dept: ULTRASOUND IMAGING | Age: 37
Discharge: HOME | End: 2022-09-29
Payer: COMMERCIAL

## 2022-09-29 ENCOUNTER — HOSPITAL ENCOUNTER (OUTPATIENT)
Dept: MAMMOGRAPHY | Age: 37
Discharge: HOME | End: 2022-09-29
Payer: COMMERCIAL

## 2022-09-29 VITALS — HEIGHT: 65 IN | BODY MASS INDEX: 20.83 KG/M2 | WEIGHT: 125 LBS

## 2022-09-29 DIAGNOSIS — N63.0 BREAST MASS: ICD-10-CM

## 2022-09-29 DIAGNOSIS — C50.411 MALIGNANT NEOPLASM OF UPPER-OUTER QUADRANT OF RIGHT FEMALE BREAST, UNSPECIFIED ESTROGEN RECEPTOR STATUS (HCC): ICD-10-CM

## 2022-09-29 DIAGNOSIS — Z09 FOLLOW UP: ICD-10-CM

## 2022-09-29 PROCEDURE — 76642 ULTRASOUND BREAST LIMITED: CPT

## 2022-09-29 PROCEDURE — 77065 DX MAMMO INCL CAD UNI: CPT

## 2022-09-29 PROCEDURE — 2709999900 US PLACE BREAST LOC DEVICE 1ST LESION RIGHT

## 2022-09-30 ENCOUNTER — TELEPHONE (OUTPATIENT)
Dept: SURGERY | Age: 37
End: 2022-09-30

## 2022-10-05 ENCOUNTER — OFFICE VISIT (OUTPATIENT)
Dept: SURGERY | Age: 37
End: 2022-10-05
Payer: COMMERCIAL

## 2022-10-05 VITALS
WEIGHT: 125.6 LBS | SYSTOLIC BLOOD PRESSURE: 130 MMHG | BODY MASS INDEX: 20.93 KG/M2 | OXYGEN SATURATION: 95 % | HEIGHT: 65 IN | DIASTOLIC BLOOD PRESSURE: 77 MMHG | TEMPERATURE: 98.3 F | HEART RATE: 84 BPM | RESPIRATION RATE: 15 BRPM

## 2022-10-05 DIAGNOSIS — C50.911 MALIGNANT NEOPLASM OF RIGHT FEMALE BREAST, UNSPECIFIED ESTROGEN RECEPTOR STATUS, UNSPECIFIED SITE OF BREAST (HCC): Primary | ICD-10-CM

## 2022-10-05 PROCEDURE — 99205 OFFICE O/P NEW HI 60 MIN: CPT | Performed by: SURGERY

## 2022-10-05 NOTE — LETTER
Surgery Schedule Request Form  [facility]  [address] [place] [address]. Mason Coombsbury: 11-     TIME OF SURGERY: 7:30 am      Confirmation#:__________________        Surgeon Name: Eunice Coley MD    Phone: [phone]     Fax: [phone]  Co-Case Additional Surgeon: Ghada Hannah MD  Procedure Name: 1) Bilateral breast reconstruction with direct to implant reconstruction with Alloderm           2) Possible stage 1 tissue expander placement with Alloderm   CPT CODES (required for scheduling): 72145, 05486 & 79511  DIAGNOSIS: C50.911  Breast Cancer    LENGTH OF PROCEDURE: 2 hours  Patient Status: 23 hour observation    Labs Needed:   CBC _x__  PT/PTT_x__ INR __x__  CMP _x__ EKG __x_   Urine Hcg ___            PATIENT NAME: Kimber Diaz            YOB: 1985  SEX: female   SS #:   PHONE: [phone] (home)     Pre-Op to be done by: PCP  Cardiac Clearance Done by: per PCP    Pt Position:  supine  Patient to meet with Anesthesiology prior to surgery: no     Medications to be stopped 5 days before surgery: anticoagulation     Ancef 2 gm IV OCTOR (NOTE:  If patient weight is > 120 kg, Administer 3 gm)  Other Orders: Transexemic acid 1g IV OCTOR; No Decadron; SA    INSURANCE: [facility]                                            SUBSCRIBER NAME: Pop Banks  MEMBER ID: [id number]                                      AUTHORIZATION #: I spoke with Juan Stephens at [facility] ([phone]) to see if CPT Code 26 [id number] or 34689 require pre certification. The codes do require pre certification, which I started during our call. All of the codes and diagnosis are valid and billable. Pre determination is not needed with a breast cancer diagnosis. Call Reference # [id number]    PCP: Clarisa Turcios MD                                       ANESTHESIA:  Luzma Rinaldi MD                             FAX TO: [phone]   QUESTIONS?  CALL: [phone]           [facility]   Fax   [phone]            Date Of Procedure: 2022    PATIENT:       Martha Fu                    :  1985      No Known Allergies    No.   PHYSICIAN ORDERS   HUC/  RN      ORDERS WITH CHECK BOXES MUST BE SELECTED. ALL OTHER ORDERS WILL BE AUTOMATICALLY INITIATED. Date / Time of Order:   10/6/22   12:35 PM          Procedure: Bilateral breast reconstruction with direct to implant reconstruction                      with Alloderm           Possible stage 1 tissue expander placement with Alloderm        1. Cefazolin (Ancef) 2 gm IV OCTOR   ** NOTE:  If patient weight is > 120 kg, Administer 3 gm         2. ** If PCN allergy, then Clindamycin 600mg IV OCTOR.   ** If prior history of MRSA, Vancomycin 1g IV OCTOR (please check with renal function prior to administration)       3.  Other orders: Transexemic acid 1g IV OCTOR; No Decadron; SA       Physician / Surgeon:  Tatianna Song MD  Office Ph:  [phone]       Physician Signature:

## 2022-10-05 NOTE — Clinical Note
I wish we could keep her nipples :(  Its going to go from an absolutely ridiculously stunning reconstruction to an average one :(

## 2022-10-05 NOTE — PROGRESS NOTES
[facility]    CC: Breast CA     Referring physician: Shahid Urrutia MD    HPI: This is a 29 y.o. female with a PMHx as delineated below who presents in consultation for breast reconstruction. She was found to have right breast cancer with metastasis to the lymph node and desires to proceed with bilateral mastectomy with reconstruction. Plastic surgery was consulted for evaluation and treatment. The specifics of her breast cancer workup / treatment is as follows:     Diagnosis: invasive ductal & PalB2  Mo/Yr Diagnosed: 5/12  Breast Involved:Right  Stage: 3  Nikki status: Positive  ER: Positive NM: Unknown HER2: Unknown    Oncologist: Dr. Afshan Gilmore MD  Radiation: None    Chemo/Meds: Completes neoadjuvant chemotherapy (estimated completion date 10/25/22)  Pregnancy/Miscarriages: 3 / 1    PMHx:   Past Medical History:   Diagnosis Date    Breast [facility] [address]     Cancer ([facility] 05/12/2022    right breast    Hx antineoplastic chemo      PSHx:   Past Surgical History:   Procedure Laterality Date    CHOLECYSTECTOMY  10-    PORT SURGERY Left 6/3/2022    LEFT SUBCLAVIAN PORT PLACEMENT performed by Steve Lantigua MD at [address] Right 5/12/2022    US BREAST NEEDLE BIOPSY RIGHT 5/12/2022 [address] [facility] ULTRASOUND    US GUIDED NEEDLE LOC OF RIGHT BREAST Right 9/29/2022    US GUIDED NEEDLE LOC OF RIGHT BREAST 9/29/2022 [address] [facility] ULTRASOUND    US LYMPH NODE BIOPSY  5/12/2022    US LYMPH NODE BIOPSY 5/12/2022 [address] M[facility] ULTRASOUND     Allergies: No Known Allergies  FHx: Past history of breast CA: Unsure, but believes yes  Meds:   No current outpatient medications on file. No current facility-administered medications for this visit. SocHx: Smoking: No    ETOH: none    Drug use: No    ROS   Constitutional: Negative for chills and fever. HENT: Negative for congestion, facial swelling, and voice change. Eyes: Negative for photophobia and visual disturbance. Respiratory: Negative for apnea, cough, chest tightness and shortness of breath. Cardiovascular: Negative for chest pain and palpitations. Gastrointestinal: Negative for dysphagia and early satiety. Genitourinary: Negative for difficulty urinating, dysuria, flank pain, frequency and hematuria. Musculoskeletal: Negative for new gait problem, joint swelling and myalgias. Skin: Negative for color change, pallor and rash. Endocrine: negative for tremors, temperature intolerance or polydipsia. Allergic/Immunologic: Negative for new environmental or food allergies. Neurological: Negative for dizziness, seizures, speech difficulty, numbness. Hematological: Negative for adenopathy. Psychiatric/Behavioral: Negative for agitation and confusion. EXAM  /77   Pulse 84   Temp 98.3 °F (36.8 °C) (Temporal)   Resp 15   Ht 5' 5\" (1.651 m)   Wt 125 lb 9.6 oz (57 kg)   SpO2 95%   BMI 20.90 kg/m²     GEN: NAD, pleasant, healthy  CVS: RRR  PULM: No respiratory distress  HEENT: PERRLA/EOMI; hearing appears within normal limits  NECK: Supple with trachea in midline, no masses  EXT: No lymphedema noted  ABD: soft/NT/ND  NEURO: No focal deficits, no obvious CN deficits  BACK: Bilateral latiss muscle intact  BREAST:   Physical Exam    Bra size: 36B  Desired bra size: \"C/D\"  Ptosis grade:   R: 1   L: 1  The left breast size is larger than the right breast.  There were no palpable masses with no palpable lymphadenopathy in the ipsilateral right axilla.    Nipple retraction: No   Bilateral nipple piercings  Port on the left upper chest    Left breast sternal notch to nipple: 20 cm  Right breast sternal notch to nipple: 19.1 cm    Left breast nipple to inframammary fold: 4.1 cm  Right breast nipple to inframammary fold: 4.6 cm    Left breast width 14 cm  Right breast width 13.1 cm    IMAGING: Reviewed    IMP: 29 y.o. female with breast cancer who presents for discussion regarding reconstruction options  PLAN: Given the location of the malignancy, will need removal of her nipple, and the patient desires to have both nipples removed. Thus, will approach via a vertical - possible Wise pattern - mastectomy with potential direct to implant reconstruction. Will work with Dr. Rafael Mcdowell and schedule. A discussion regarding surgical options including immediate vs delayed approaches, implant based vs autologous, as well as additional planned revisional surgeries was performed with the patient and family. Multiple autologous donor sites were discussed as well. Clinical photos were obtained. We additionally discussed the FDA recommendations regarding monitoring of silicone implants. The risks, benefits, alternatives, outcomes, personnel involved were discussed. Specifically, the risks including, but not limited to: bleeding that may necessitate transfusion or operation, infection, seroma, reoperation, nonhealing, poor cosmetic outcome, asymmetry, scarring, partial or total flap loss, donor site morbidity, VTE (DVT/PE), and death were discussed. All questions were answered in a satisfactory manner according to the patient. Total encounter time: 60 min with > 50% spent with face to face (or vitual) counseling and coordination of care.     Den Nicholas MD  [facility]  10/05/22

## 2022-10-06 ENCOUNTER — TELEPHONE (OUTPATIENT)
Dept: SURGERY | Age: 37
End: 2022-10-06

## 2022-10-06 NOTE — TELEPHONE ENCOUNTER
The patient was in the office to see Dr. Asia Tomas yesterday. PLAN: Given the location of the malignancy, will need removal of her nipple, and the patient desires to have both nipples removed. Thus, will approach via a vertical - possible Wise pattern - mastectomy with potential direct to implant reconstruction. Will work with Dr. Keerthi Alfonso and schedule. I received a surgery letter. I spoke with Cathyhiren Marlenramone at [facility] ([phone]) to see if CPT Code 03 5301 7984, 63613 or 02131 require pre certification. The codes do require pre certification, which I started during our call. All of the codes and diagnosis are valid and billable. Pre determination is not needed with a breast cancer diagnosis. Call Reference # [id number]    Time HELD 11- 7:30 am.     I spoke with the patient at the home number listed. The patient is now scheduled for surgery with  on 11-. The patient is aware of H&P    The patient is scheduled for her post op appointment 12-6-2022. I will submit the surgery letter today. I will fax the Alloderm order to [facility] today. I will scan the order and the fax success into Epic under the media tab. The patient will  the surgery information and instructions next week at her education appointment. I will close this phone note.

## 2022-10-14 ENCOUNTER — NURSE ONLY (OUTPATIENT)
Dept: SURGERY | Age: 37
End: 2022-10-14

## 2022-10-14 DIAGNOSIS — C50.911 MALIGNANT NEOPLASM OF RIGHT FEMALE BREAST, UNSPECIFIED ESTROGEN RECEPTOR STATUS, UNSPECIFIED SITE OF BREAST (HCC): Primary | ICD-10-CM

## 2022-10-14 NOTE — PROGRESS NOTES
[facility]  Breast Cancer Education    Patient: Bartolo Barajas  YOB: 1985    HPI: Bartolo Barajas is a 40 y.o. female who presents today for breast cancer education. She was found to have right breast cancer with metastasis to the lymph node and desires to proceed with bilateral mastectomy with reconstruction. Greater than 60 minutes was spent face to face with patient and  discussing preoperative and postoperative expectations, including wound care, surgical bra, drain care, medications, nutrition intake of increased protein and activity restrictions. Implant and tissue expander reconstruction was discussed , including TE fill timeline, implant surveillance and follow up. Chief Complaint   Patient presents with    Other     Breast Cancer Education       Plan: Kimber wishes to proceed with bilateral mastectomy with 1) Bilateral breast reconstruction with direct to implant reconstruction with Alloderm   2) Possible stage 1 tissue expander placement with Alloderm. Surgery is scheduled on 11/29/2022.          Alexandria Keith, BSN, RN [address] [place] [address] [facility] and Reconstructive Surgery  [phone]

## 2022-11-23 NOTE — PROGRESS NOTES
[facility] PRE-SURGICAL TESTING INSTRUCTIONS                      PRIOR TO PROCEDURE DATE:    1. PLEASE FOLLOW ANY INSTRUCTIONS GIVEN TO YOU PER YOUR SURGEON. 2. Arrange for someone to drive you home and be with you for the first 24 hours after discharge for your safety after your procedure for which you received sedation. Ensure it is someone we can share information with regarding your discharge. NOTE: At this time ONLY 2 ADULTS may accompany you   One person MUST stay at hospital entire time if outpatient surgery      3. You must contact your surgeon for instructions IF:  You are taking any blood thinners, aspirin, anti-inflammatory or vitamins. There is a change in your physical condition such as a cold, fever, rash, cuts, sores, or any other infection, especially near your surgical site. 4. Do not drink alcohol the day before or day of your procedure. Do not use any recreational marijuana at least 24 hours or street drugs (heroin, cocaine) at minimum 5 days prior to your procedure. 5. A Pre-Surgical History and Physical MUST be completed WITHIN 30 DAYS OR LESS prior to your procedure. by your Physician or an Urgent Care        THE DAY OF YOUR PROCEDURE:  1. Follow instructions for ARRIVAL TIME as DIRECTED BY YOUR SURGEON. 2. Enter the MAIN entrance from [employer] and follow the signs to the free Parking [employer] or [employer] (offered free of charge 7 am-5pm). 3. Enter the Main Entrance of the hospital (do not enter from the lower level of the parking garage). Upon entrance, check in with the  at the surgical information desk on your LEFT. Bring your insurance card and photo ID to register      4. DO NOT EAT ANYTHING 8 hours prior to arrival for surgery. You may have up to 8 ounces of water 4 hours prior to your arrival for surgery.    NOTE: ALL Gastric, Bariatric & Bowel surgery patients - you MUST follow your surgeon's instructions regarding eating/ drinking as you will have very specific instructions to follow. If you did not receive these, call your surgeon's office immediately. 5. MEDICATIONS:  Take the following medications with a SMALL sip of water:   Use your usual dose of inhalers the morning of surgery. BRING your rescue inhaler with you to hospital.   Anesthesia does NOT want you to take insulin the morning of surgery. They will control your blood sugar while you are at the hospital. Please contact your ordering physician for instructions regarding your insulin the night before your procedure. If you have an insulin pump, please keep it set on basal rate. Bariatric patient's call your surgeon if on diabetic medications as some may need to be stopped 1 week prior to surgery    6. Do not swallow additional water when brushing teeth. No gum, candy, mints, or ice chips. Refrain from smoking or at least decrease the amount on day of surgery. 7. Morning of surgery:   Take a shower with an antibacterial soap (i.e., Safeguard or Dial) OR your physician may have instructed you to use Hibiclens. Dress in loose, comfortable clothing appropriate for redressing after your procedure. Do not wear jewelry (including body piercings), make-up (especially NO eye make-up), fingernail polish (NO toenail polish if foot/leg surgery), lotion, powders, or metal hairclips. Do not shave or wax for 72 hours prior to procedure near your operative site. Shaving with a razor can irritate your skin and make it easier to develop an infection. On the day of your procedure, any hair that needs to be removed near the surgical site will be 'clipped' by a healthcare worker using a special clipper designed to avoid skin irritation. 8. Dentures, glasses, or contacts will need to be removed before your procedure. Bring cases for your glasses, contacts, dentures, or hearing aids to protect them while you are in surgery.       9. If you use a CPAP, please bring it with you on the day of your procedure. 10. We recommend that valuable personal belongings such as cash, cell phones, e-tablets, or jewelry, be left at home during your stay. The hospital will not be responsible for valuables that are not secured in the hospital safe. However, if your insurance requires a co-pay, you may want to bring a method of payment, i.e., Check or credit card, if you wish to pay your co-pay the day of surgery. 11. If you are to stay overnight, you may bring a bag with personal items. Please have any large items you may need brought in by your family after your arrival to your hospital room. 12. If you have a Living Will or Durable Power of , please bring a copy on the day of your procedure. How we keep you safe and work to prevent surgical site infections:   1. Health care workers should always check your ID bracelet to verify your name and birth date. You will be asked many times to state your name, date of birth, and allergies. 2. Health care workers should always clean their hands with soap or alcohol gel before providing care to you. It is okay to ask anyone if they cleaned their hands before they touch you. 3. You will be actively involved in verifying the type of procedure you are having and ensuring the correct surgical site. This will be confirmed multiple times prior to your procedure. Do NOT sebastián your surgery site UNLESS instructed to by your surgeon. 4. When you are in the operating room, your surgical site will be cleansed with a special soap, and in most cases, you will be given an antibiotic before the surgery begins. What to expect AFTER your procedure? 1. Immediately following your procedure, your will be taken to the PACU for the first phase of your recovery. Your nurse will help you recover from any potential side effects of anesthesia, such as extreme drowsiness, changes in your vital signs or breathing patterns.  Nausea, headache, muscle aches, or sore throat may also occur after anesthesia. Your nurse will help you manage these potential side effects. 2. For comfort and safety, arrange to have someone at home with you for the first 24 hours after discharge. 3. You and your family will be given written instructions about your diet, activity, dressing care, medications, and return visits. 4. Once at home, should issues with nausea, pain, or bleeding occur, or should you notice any signs of infection, you should call your surgeon. 5. Always clean your hands before and after caring for your wound. Do not let your family touch your surgery site without cleaning their hands. 6. Narcotic pain medications can cause significant constipation. You may want to add a stool softener to your postoperative medication schedule or speak to your surgeon on how best to manage this SIDE EFFECT. SPECIAL INSTRUCTIONS     Thank you for allowing us to care for you. We strive to exceed your expectations in the delivery of care and service provided to you and your family. If you need to contact the [place] [address] staff for any reason, please call us at [phone]    Instructions reviewed with patient during preadmission testing phone interview.   Geneva Andrade RN.11/23/2022 .1:46 PM      ADDITIONAL EDUCATIONAL INFORMATION REVIEWED PER PHONE WITH YOU AND/OR YOUR FAMILY:  No Hibiclens® Bathing Instructions   Yes Antibacterial Soap

## 2022-11-23 NOTE — PROGRESS NOTES
Place patient label inside box (if no patient label, complete below)  Name:  :  MR#:     Cysandhya Rain / PROCEDURE  I (we), Vijaya Muñoz  authorize DR Yusuf Dhillon  and/or such assistants as may be selected by him/her, to perform the following operation/procedure(s): BILATERAL BREAST RECONSTRUCTION WITH DIRECT TO IMPLANT RECONSTRUCTION WITH ALLODERM, POSSIBLE STAGE 1 TISSUE EXPANDER PLACEMENT WITH ALLODERM       Note: If unable to obtain consent prior to an emergent procedure, document the emergent reason in the medical record. This procedure has been explained to my (our) satisfaction and included in the explanation was: The intended benefit, nature, and extent of the procedure to be performed; The significant risks involved and the probability of success; Alternative procedures and methods of treatment; The dangers and probable consequences of such alternatives (including no procedure or treatment); The expected consequences of the procedure on my future health; Whether other qualified individuals would be performing important surgical tasks and/or whether  would be present to advise or support the procedure. I (we) understand that there are other risks of infection and other serious complications in the pre-operative/procedural and postoperative/procedural stages of my (our) care. I (we) have asked all of the questions which I (we) thought were important in deciding whether or not to undergo treatment or diagnosis. These questions have been answered to my (our) satisfaction. I (we) understand that no assurance can be given that the procedure will be a success, and no guarantee or warranty of success has been given to me (us).     It has been explained to me (us) that during the course of the operation/procedure, unforeseen conditions may be revealed that necessitate extension of the original procedure(s) or different procedure(s) than those set forth in Paragraph 1. I (we) authorize and request that the above-named physician, his/her assistants or his/her designees, perform procedures as necessary and desirable if deemed to be in my (our) best interest.     Revised 8/2/2021                                                                          Page 1 of 2           I acknowledge that health care personnel may be observing this procedure for the purpose of medical education or other specified purposes as may be necessary as requested and/or approved by my (our) physician. I (we) consent to the disposal by the hospital Pathologist of the removed tissue, parts or organs in accordance with hospital policy. I do ____ do not ____ consent to the use of a local infiltration pain blocking agent that will be used by my provider/surgical provider to help alleviate pain during my procedure. I do ____ do not ____ consent to an emergent blood transfusion in the case of a life-threatening situation that requires blood components to be administered. This consent is valid for 24 hours from the beginning of the procedure. This patient does ____ or does not ____ currently have a DNR status/order. If DNR order is in place, obtain Addendum to the Surgical Consent for ALL Patients with a DNR Order to address aileen-operative status for limited intervention or DNR suspension.      I have read and fully understand the above Consent for Operation/Procedure and that all blanks were completed before I signed the consent.   _____________________________       _____________________      ____/____am/pm  Signature of Patient or legal representative      Printed Name / Relationship            Date / Time   ____________________________       _____________________      ____/____am/pm  Witness to Signature                                    Printed Name                    Date / Time    If patient is unable to sign or is a minor, complete the following)  Patient is a minor, ____ years of age, or unable to sign because:   ______________________________________________________________________________________________    If a phone consent is obtained, consent will be documented by using two health care professionals, each affirming that the consenting party has no questions and gives consent for the procedure discussed with the physician/provider.   _____________________          ____________________       _____/_____am/pm   2nd witness to phone consent        Printed name           Date / Time    Informed Consent:  I have provided the explanation described above in section 1 to the patient and/or legal representative.  I have provided the patient and/or legal representative with an opportunity to ask any questions about the proposed operation/procedure.   ___________________________          ____________________         ____/____am/pm  Provider / Proceduralist                            Printed name            Date / Time  Revised 8/2/2021                                                                      Page 2 of 2

## 2022-11-23 NOTE — PROGRESS NOTES
Place patient label inside box (if no patient label, complete below)  Name:  :  MR#:     Mainor Friend / PROCEDURE  I (we), EDITA RIVERA   authorize DR Jesu White and/or such assistants as may be selected by him/her, to perform the following operation/procedure(s): RIGHT BREAST SENTINEL NODE BIOPSY (7:30)AM, BILATERAL SIMPLE MASTECTOMY, ULTRASOUND LATERALITY, RIGHT BREAST, 1 RADIOFREQUENCY TAG, AXILLA TARGET: LYMPH NODE CONTAINING BIOPSY MARKER (22)       Note: If unable to obtain consent prior to an emergent procedure, document the emergent reason in the medical record. This procedure has been explained to my (our) satisfaction and included in the explanation was: The intended benefit, nature, and extent of the procedure to be performed; The significant risks involved and the probability of success; Alternative procedures and methods of treatment; The dangers and probable consequences of such alternatives (including no procedure or treatment); The expected consequences of the procedure on my future health; Whether other qualified individuals would be performing important surgical tasks and/or whether  would be present to advise or support the procedure. I (we) understand that there are other risks of infection and other serious complications in the pre-operative/procedural and postoperative/procedural stages of my (our) care. I (we) have asked all of the questions which I (we) thought were important in deciding whether or not to undergo treatment or diagnosis. These questions have been answered to my (our) satisfaction. I (we) understand that no assurance can be given that the procedure will be a success, and no guarantee or warranty of success has been given to me (us).     It has been explained to me (us) that during the course of the operation/procedure, unforeseen conditions may be revealed that necessitate extension of the original procedure(s) or different procedure(s) than those set forth in Paragraph 1. I (we) authorize and request that the above-named physician, his/her assistants or his/her designees, perform procedures as necessary and desirable if deemed to be in my (our) best interest.     Revised 8/2/2021                                                                          Page 1 of 2           I acknowledge that health care personnel may be observing this procedure for the purpose of medical education or other specified purposes as may be necessary as requested and/or approved by my (our) physician. I (we) consent to the disposal by the hospital Pathologist of the removed tissue, parts or organs in accordance with hospital policy. I do ____ do not ____ consent to the use of a local infiltration pain blocking agent that will be used by my provider/surgical provider to help alleviate pain during my procedure. I do ____ do not ____ consent to an emergent blood transfusion in the case of a life-threatening situation that requires blood components to be administered. This consent is valid for 24 hours from the beginning of the procedure. This patient does ____ or does not ____ currently have a DNR status/order. If DNR order is in place, obtain Addendum to the Surgical Consent for ALL Patients with a DNR Order to address aileen-operative status for limited intervention or DNR suspension.      I have read and fully understand the above Consent for Operation/Procedure and that all blanks were completed before I signed the consent.   _____________________________       _____________________      ____/____am/pm  Signature of Patient or legal representative      Printed Name / Relationship            Date / Time   ____________________________       _____________________      ____/____am/pm  Witness to Signature                                    Printed Name                    Date / Time    If patient is unable to sign or is a minor, complete the following)  Patient is a minor, ____ years of age, or unable to sign because:   ______________________________________________________________________________________________    If a phone consent is obtained, consent will be documented by using two health care professionals, each affirming that the consenting party has no questions and gives consent for the procedure discussed with the physician/provider.   _____________________          ____________________       _____/_____am/pm   2nd witness to phone consent        Printed name           Date / Time    Informed Consent:  I have provided the explanation described above in section 1 to the patient and/or legal representative.  I have provided the patient and/or legal representative with an opportunity to ask any questions about the proposed operation/procedure.   ___________________________          ____________________         ____/____am/pm  Provider / Proceduralist                            Printed name            Date / Time  Revised 8/2/2021                                                                      Page 2 of 2

## 2022-11-28 ENCOUNTER — ANESTHESIA EVENT (OUTPATIENT)
Dept: OPERATING ROOM | Age: 37
End: 2022-11-28
Payer: COMMERCIAL

## 2022-11-28 PROBLEM — Z15.09 MONOALLELIC MUTATION OF PALB2 GENE: Status: ACTIVE | Noted: 2022-11-28

## 2022-11-28 PROBLEM — Z15.89 MONOALLELIC MUTATION OF PALB2 GENE: Status: ACTIVE | Noted: 2022-11-28

## 2022-11-28 PROBLEM — Z17.0 MALIGNANT NEOPLASM OF UPPER-OUTER QUADRANT OF RIGHT BREAST IN FEMALE, ESTROGEN RECEPTOR POSITIVE (HCC): Status: ACTIVE | Noted: 2022-11-28

## 2022-11-28 PROBLEM — C50.411 MALIGNANT NEOPLASM OF UPPER-OUTER QUADRANT OF RIGHT BREAST IN FEMALE, ESTROGEN RECEPTOR POSITIVE (HCC): Status: ACTIVE | Noted: 2022-11-28

## 2022-11-28 PROBLEM — Z15.01 MONOALLELIC MUTATION OF PALB2 GENE: Status: ACTIVE | Noted: 2022-11-28

## 2022-11-28 PROBLEM — Z92.21 HISTORY OF CHEMOTHERAPY: Status: ACTIVE | Noted: 2022-11-28

## 2022-11-28 NOTE — PROGRESS NOTES
11/28/22 @ 0919 Left message at office at PCP office for note confirming EKG. Darren Aguilerashaw    11/28/22 @ [phone] Spoke with office nurse and Sunday Marisela NP is out of office and Dr Harish Sweet is not available today.   Kavita Avendaño

## 2022-11-29 ENCOUNTER — APPOINTMENT (OUTPATIENT)
Dept: MAMMOGRAPHY | Age: 37
End: 2022-11-29
Attending: SURGERY
Payer: COMMERCIAL

## 2022-11-29 ENCOUNTER — ANESTHESIA (OUTPATIENT)
Dept: OPERATING ROOM | Age: 37
End: 2022-11-29
Payer: COMMERCIAL

## 2022-11-29 ENCOUNTER — HOSPITAL ENCOUNTER (OUTPATIENT)
Dept: NUCLEAR MEDICINE | Age: 37
Discharge: HOME | End: 2022-11-29
Attending: SURGERY
Payer: COMMERCIAL

## 2022-11-29 ENCOUNTER — HOSPITAL ENCOUNTER (OUTPATIENT)
Age: 37
Setting detail: OUTPATIENT SURGERY
Discharge: HOME | End: 2022-11-29
Attending: SURGERY | Admitting: SURGERY
Payer: COMMERCIAL

## 2022-11-29 VITALS
SYSTOLIC BLOOD PRESSURE: 128 MMHG | BODY MASS INDEX: 20.26 KG/M2 | HEART RATE: 81 BPM | RESPIRATION RATE: 16 BRPM | HEIGHT: 65 IN | TEMPERATURE: 97.7 F | DIASTOLIC BLOOD PRESSURE: 79 MMHG | WEIGHT: 121.6 LBS | OXYGEN SATURATION: 98 %

## 2022-11-29 DIAGNOSIS — Z15.02 PALB2-RELATED BREAST CANCER (HCC): ICD-10-CM

## 2022-11-29 DIAGNOSIS — Z15.89 MONOALLELIC MUTATION OF NF1 GENE: ICD-10-CM

## 2022-11-29 DIAGNOSIS — Z15.09 PALB2-RELATED BREAST CANCER (HCC): ICD-10-CM

## 2022-11-29 DIAGNOSIS — C77.3 SECONDARY MALIGNANT NEOPLASM OF AXILLARY LYMPH NODES (HCC): ICD-10-CM

## 2022-11-29 DIAGNOSIS — C50.411 CARCINOMA OF UPPER-OUTER QUADRANT OF FEMALE BREAST, RIGHT (HCC): ICD-10-CM

## 2022-11-29 DIAGNOSIS — Z15.89 PALB2-RELATED BREAST CANCER (HCC): ICD-10-CM

## 2022-11-29 DIAGNOSIS — C77.3 CARCINOMA OF RIGHT BREAST METASTATIC TO AXILLARY LYMPH NODE (HCC): ICD-10-CM

## 2022-11-29 DIAGNOSIS — C50.911 CARCINOMA OF RIGHT BREAST METASTATIC TO AXILLARY LYMPH NODE (HCC): ICD-10-CM

## 2022-11-29 DIAGNOSIS — G89.18 POST-OP PAIN: Primary | ICD-10-CM

## 2022-11-29 DIAGNOSIS — C50.911 MALIGNANT NEOPLASM OF RIGHT FEMALE BREAST, UNSPECIFIED ESTROGEN RECEPTOR STATUS, UNSPECIFIED SITE OF BREAST (HCC): ICD-10-CM

## 2022-11-29 DIAGNOSIS — C50.919 PALB2-RELATED BREAST CANCER (HCC): ICD-10-CM

## 2022-11-29 LAB — PREGNANCY, URINE: NEGATIVE

## 2022-11-29 PROCEDURE — 2500000003 HC RX 250 WO HCPCS: Performed by: NURSE ANESTHETIST, CERTIFIED REGISTERED

## 2022-11-29 PROCEDURE — 88307 TISSUE EXAM BY PATHOLOGIST: CPT

## 2022-11-29 PROCEDURE — 3700000001 HC ADD 15 MINUTES (ANESTHESIA): Performed by: SURGERY

## 2022-11-29 PROCEDURE — 2580000003 HC RX 258: Performed by: ANESTHESIOLOGY

## 2022-11-29 PROCEDURE — 15860 IV NJX TST VASC FLO FLAP/GRF: CPT | Performed by: SURGERY

## 2022-11-29 PROCEDURE — 15777 ACELLULAR DERM MATRIX IMPLT: CPT | Performed by: SURGERY

## 2022-11-29 PROCEDURE — A4217 STERILE WATER/SALINE, 500 ML: HCPCS | Performed by: SURGERY

## 2022-11-29 PROCEDURE — 2709999900 HC NON-CHARGEABLE SUPPLY: Performed by: SURGERY

## 2022-11-29 PROCEDURE — 7100000010 HC PHASE II RECOVERY - FIRST 15 MIN: Performed by: SURGERY

## 2022-11-29 PROCEDURE — A9520 TC99 TILMANOCEPT DIAG 0.5MCI: HCPCS | Performed by: SURGERY

## 2022-11-29 PROCEDURE — 76098 X-RAY EXAM SURGICAL SPECIMEN: CPT

## 2022-11-29 PROCEDURE — 3600000014 HC SURGERY LEVEL 4 ADDTL 15MIN: Performed by: SURGERY

## 2022-11-29 PROCEDURE — 88341 IMHCHEM/IMCYTCHM EA ADD ANTB: CPT

## 2022-11-29 PROCEDURE — 2500000003 HC RX 250 WO HCPCS: Performed by: SURGERY

## 2022-11-29 PROCEDURE — 2580000003 HC RX 258: Performed by: SURGERY

## 2022-11-29 PROCEDURE — 19340 INSJ BREAST IMPLT SM D MAST: CPT | Performed by: SURGERY

## 2022-11-29 PROCEDURE — 6360000002 HC RX W HCPCS: Performed by: NURSE ANESTHETIST, CERTIFIED REGISTERED

## 2022-11-29 PROCEDURE — C9290 INJ, BUPIVACAINE LIPOSOME: HCPCS | Performed by: SURGERY

## 2022-11-29 PROCEDURE — C1729 CATH, DRAINAGE: HCPCS | Performed by: SURGERY

## 2022-11-29 PROCEDURE — 6360000002 HC RX W HCPCS: Performed by: ANESTHESIOLOGY

## 2022-11-29 PROCEDURE — 3600000004 HC SURGERY LEVEL 4 BASE: Performed by: SURGERY

## 2022-11-29 PROCEDURE — 88342 IMHCHEM/IMCYTCHM 1ST ANTB: CPT

## 2022-11-29 PROCEDURE — 7100000001 HC PACU RECOVERY - ADDTL 15 MIN: Performed by: SURGERY

## 2022-11-29 PROCEDURE — 88331 PATH CONSLTJ SURG 1 BLK 1SPC: CPT

## 2022-11-29 PROCEDURE — 7100000011 HC PHASE II RECOVERY - ADDTL 15 MIN: Performed by: SURGERY

## 2022-11-29 PROCEDURE — 3700000000 HC ANESTHESIA ATTENDED CARE: Performed by: SURGERY

## 2022-11-29 PROCEDURE — 6360000002 HC RX W HCPCS: Performed by: SURGERY

## 2022-11-29 PROCEDURE — 84703 CHORIONIC GONADOTROPIN ASSAY: CPT

## 2022-11-29 PROCEDURE — 2720000010 HC SURG SUPPLY STERILE: Performed by: SURGERY

## 2022-11-29 PROCEDURE — 78195 LYMPH SYSTEM IMAGING: CPT

## 2022-11-29 PROCEDURE — 3430000000 HC RX DIAGNOSTIC RADIOPHARMACEUTICAL: Performed by: SURGERY

## 2022-11-29 PROCEDURE — C1789 PROSTHESIS, BREAST, IMP: HCPCS | Performed by: SURGERY

## 2022-11-29 PROCEDURE — 6360000002 HC RX W HCPCS

## 2022-11-29 PROCEDURE — 7100000000 HC PACU RECOVERY - FIRST 15 MIN: Performed by: SURGERY

## 2022-11-29 DEVICE — IMPLANTABLE DEVICE: Type: IMPLANTABLE DEVICE | Site: BREAST | Status: FUNCTIONAL

## 2022-11-29 DEVICE — GRAFT HUM TISS THK2-2.8MM THCK M PERF CNTOUR ACELLULAR DERM: Type: IMPLANTABLE DEVICE | Site: BREAST | Status: FUNCTIONAL

## 2022-11-29 RX ORDER — OXYCODONE HYDROCHLORIDE 5 MG/1
5 TABLET ORAL EVERY 6 HOURS PRN
Qty: 12 TABLET | Refills: 0 | Status: SHIPPED | OUTPATIENT
Start: 2022-11-29 | End: 2022-12-04

## 2022-11-29 RX ORDER — PROCHLORPERAZINE EDISYLATE 5 MG/ML
5 INJECTION INTRAMUSCULAR; INTRAVENOUS
Status: DISCONTINUED | OUTPATIENT
Start: 2022-11-29 | End: 2022-11-29 | Stop reason: HOSPADM

## 2022-11-29 RX ORDER — LABETALOL HYDROCHLORIDE 5 MG/ML
10 INJECTION, SOLUTION INTRAVENOUS
Status: DISCONTINUED | OUTPATIENT
Start: 2022-11-29 | End: 2022-11-29 | Stop reason: HOSPADM

## 2022-11-29 RX ORDER — SODIUM CHLORIDE, SODIUM LACTATE, POTASSIUM CHLORIDE, CALCIUM CHLORIDE 600; 310; 30; 20 MG/100ML; MG/100ML; MG/100ML; MG/100ML
INJECTION, SOLUTION INTRAVENOUS CONTINUOUS
Status: DISCONTINUED | OUTPATIENT
Start: 2022-11-29 | End: 2022-11-29 | Stop reason: HOSPADM

## 2022-11-29 RX ORDER — FENTANYL CITRATE 50 UG/ML
INJECTION, SOLUTION INTRAMUSCULAR; INTRAVENOUS PRN
Status: DISCONTINUED | OUTPATIENT
Start: 2022-11-29 | End: 2022-11-29 | Stop reason: SDUPTHER

## 2022-11-29 RX ORDER — GLYCOPYRROLATE 0.2 MG/ML
INJECTION INTRAMUSCULAR; INTRAVENOUS PRN
Status: DISCONTINUED | OUTPATIENT
Start: 2022-11-29 | End: 2022-11-29 | Stop reason: SDUPTHER

## 2022-11-29 RX ORDER — FAMOTIDINE 10 MG/ML
INJECTION, SOLUTION INTRAVENOUS PRN
Status: DISCONTINUED | OUTPATIENT
Start: 2022-11-29 | End: 2022-11-29 | Stop reason: SDUPTHER

## 2022-11-29 RX ORDER — HYDROMORPHONE HCL 110MG/55ML
PATIENT CONTROLLED ANALGESIA SYRINGE INTRAVENOUS PRN
Status: DISCONTINUED | OUTPATIENT
Start: 2022-11-29 | End: 2022-11-29 | Stop reason: SDUPTHER

## 2022-11-29 RX ORDER — OXYCODONE HYDROCHLORIDE 5 MG/1
5 TABLET ORAL
Status: DISCONTINUED | OUTPATIENT
Start: 2022-11-29 | End: 2022-11-29 | Stop reason: HOSPADM

## 2022-11-29 RX ORDER — SODIUM CHLORIDE 0.9 % (FLUSH) 0.9 %
5-40 SYRINGE (ML) INJECTION EVERY 12 HOURS SCHEDULED
Status: DISCONTINUED | OUTPATIENT
Start: 2022-11-29 | End: 2022-11-29 | Stop reason: HOSPADM

## 2022-11-29 RX ORDER — MEPERIDINE HYDROCHLORIDE 25 MG/ML
12.5 INJECTION INTRAMUSCULAR; INTRAVENOUS; SUBCUTANEOUS EVERY 5 MIN PRN
Status: DISCONTINUED | OUTPATIENT
Start: 2022-11-29 | End: 2022-11-29 | Stop reason: HOSPADM

## 2022-11-29 RX ORDER — HYDRALAZINE HYDROCHLORIDE 20 MG/ML
10 INJECTION INTRAMUSCULAR; INTRAVENOUS
Status: DISCONTINUED | OUTPATIENT
Start: 2022-11-29 | End: 2022-11-29 | Stop reason: HOSPADM

## 2022-11-29 RX ORDER — MIDAZOLAM HYDROCHLORIDE 1 MG/ML
INJECTION INTRAMUSCULAR; INTRAVENOUS PRN
Status: DISCONTINUED | OUTPATIENT
Start: 2022-11-29 | End: 2022-11-29 | Stop reason: SDUPTHER

## 2022-11-29 RX ORDER — ONDANSETRON 2 MG/ML
INJECTION INTRAMUSCULAR; INTRAVENOUS PRN
Status: DISCONTINUED | OUTPATIENT
Start: 2022-11-29 | End: 2022-11-29 | Stop reason: SDUPTHER

## 2022-11-29 RX ORDER — LIDOCAINE HYDROCHLORIDE 20 MG/ML
INJECTION, SOLUTION INTRAVENOUS PRN
Status: DISCONTINUED | OUTPATIENT
Start: 2022-11-29 | End: 2022-11-29 | Stop reason: SDUPTHER

## 2022-11-29 RX ORDER — CEPHALEXIN 500 MG/1
500 CAPSULE ORAL 4 TIMES DAILY
Qty: 40 CAPSULE | Refills: 0 | Status: SHIPPED | OUTPATIENT
Start: 2022-11-29 | End: 2022-12-09

## 2022-11-29 RX ORDER — ROCURONIUM BROMIDE 10 MG/ML
INJECTION, SOLUTION INTRAVENOUS PRN
Status: DISCONTINUED | OUTPATIENT
Start: 2022-11-29 | End: 2022-11-29 | Stop reason: SDUPTHER

## 2022-11-29 RX ORDER — ISOSULFAN BLUE 50 MG/5ML
INJECTION, SOLUTION SUBCUTANEOUS PRN
Status: DISCONTINUED | OUTPATIENT
Start: 2022-11-29 | End: 2022-11-29 | Stop reason: HOSPADM

## 2022-11-29 RX ORDER — SODIUM CHLORIDE 0.9 % (FLUSH) 0.9 %
5-40 SYRINGE (ML) INJECTION PRN
Status: DISCONTINUED | OUTPATIENT
Start: 2022-11-29 | End: 2022-11-29 | Stop reason: HOSPADM

## 2022-11-29 RX ORDER — ONDANSETRON 2 MG/ML
4 INJECTION INTRAMUSCULAR; INTRAVENOUS ONCE
Status: DISCONTINUED | OUTPATIENT
Start: 2022-11-29 | End: 2022-11-29 | Stop reason: HOSPADM

## 2022-11-29 RX ORDER — SODIUM CHLORIDE 9 MG/ML
INJECTION, SOLUTION INTRAVENOUS PRN
Status: DISCONTINUED | OUTPATIENT
Start: 2022-11-29 | End: 2022-11-29 | Stop reason: HOSPADM

## 2022-11-29 RX ORDER — ONDANSETRON 2 MG/ML
4 INJECTION INTRAMUSCULAR; INTRAVENOUS
Status: COMPLETED | OUTPATIENT
Start: 2022-11-29 | End: 2022-11-29

## 2022-11-29 RX ORDER — ONDANSETRON 2 MG/ML
INJECTION INTRAMUSCULAR; INTRAVENOUS
Status: COMPLETED
Start: 2022-11-29 | End: 2022-11-29

## 2022-11-29 RX ORDER — PROPOFOL 10 MG/ML
INJECTION, EMULSION INTRAVENOUS PRN
Status: DISCONTINUED | OUTPATIENT
Start: 2022-11-29 | End: 2022-11-29 | Stop reason: SDUPTHER

## 2022-11-29 RX ADMIN — HYDROMORPHONE HYDROCHLORIDE 0.6 MG: 2 INJECTION, SOLUTION INTRAMUSCULAR; INTRAVENOUS; SUBCUTANEOUS at 07:55

## 2022-11-29 RX ADMIN — SODIUM CHLORIDE, POTASSIUM CHLORIDE, SODIUM LACTATE AND CALCIUM CHLORIDE: 600; 310; 30; 20 INJECTION, SOLUTION INTRAVENOUS at 12:15

## 2022-11-29 RX ADMIN — ONDANSETRON 4 MG: 2 INJECTION INTRAMUSCULAR; INTRAVENOUS at 12:18

## 2022-11-29 RX ADMIN — ONDANSETRON 4 MG: 2 INJECTION INTRAMUSCULAR; INTRAVENOUS at 07:41

## 2022-11-29 RX ADMIN — GLYCOPYRROLATE 0.1 MG: 0.2 INJECTION INTRAMUSCULAR; INTRAVENOUS at 07:35

## 2022-11-29 RX ADMIN — ONDANSETRON 4 MG: 2 INJECTION INTRAMUSCULAR; INTRAVENOUS at 11:04

## 2022-11-29 RX ADMIN — TILMANOCEPT 0.82 MILLICURIE: KIT at 08:02

## 2022-11-29 RX ADMIN — CEFAZOLIN 2000 MG: 2 INJECTION, POWDER, FOR SOLUTION INTRAMUSCULAR; INTRAVENOUS at 07:27

## 2022-11-29 RX ADMIN — HYDROMORPHONE HYDROCHLORIDE 0.4 MG: 2 INJECTION, SOLUTION INTRAMUSCULAR; INTRAVENOUS; SUBCUTANEOUS at 09:13

## 2022-11-29 RX ADMIN — ROCURONIUM BROMIDE 50 MG: 10 INJECTION INTRAVENOUS at 07:32

## 2022-11-29 RX ADMIN — FAMOTIDINE 20 MG: 10 INJECTION, SOLUTION INTRAVENOUS at 07:27

## 2022-11-29 RX ADMIN — SUGAMMADEX 200 MG: 100 INJECTION, SOLUTION INTRAVENOUS at 10:05

## 2022-11-29 RX ADMIN — TRANEXAMIC ACID 1000 MG: 1 INJECTION, SOLUTION INTRAVENOUS at 07:55

## 2022-11-29 RX ADMIN — SODIUM CHLORIDE, POTASSIUM CHLORIDE, SODIUM LACTATE AND CALCIUM CHLORIDE: 600; 310; 30; 20 INJECTION, SOLUTION INTRAVENOUS at 06:50

## 2022-11-29 RX ADMIN — FENTANYL CITRATE 100 MCG: 50 INJECTION, SOLUTION INTRAMUSCULAR; INTRAVENOUS at 07:30

## 2022-11-29 RX ADMIN — PROPOFOL 130 MG: 10 INJECTION, EMULSION INTRAVENOUS at 07:32

## 2022-11-29 RX ADMIN — HYDROMORPHONE HYDROCHLORIDE 1 MG: 2 INJECTION, SOLUTION INTRAMUSCULAR; INTRAVENOUS; SUBCUTANEOUS at 10:15

## 2022-11-29 RX ADMIN — MIDAZOLAM HYDROCHLORIDE 2 MG: 2 INJECTION, SOLUTION INTRAMUSCULAR; INTRAVENOUS at 07:27

## 2022-11-29 RX ADMIN — LIDOCAINE HYDROCHLORIDE 50 MG: 20 INJECTION, SOLUTION INTRAVENOUS at 07:32

## 2022-11-29 RX ADMIN — GLYCOPYRROLATE 0.1 MG: 0.2 INJECTION INTRAMUSCULAR; INTRAVENOUS at 07:45

## 2022-11-29 ASSESSMENT — PAIN - FUNCTIONAL ASSESSMENT: PAIN_FUNCTIONAL_ASSESSMENT: 0-10

## 2022-11-29 ASSESSMENT — PAIN SCALES - GENERAL: PAINLEVEL_OUTOF10: 0

## 2022-11-29 NOTE — PROGRESS NOTES
Patient to pacu 16 s/p RIGHT BREAST SENTINEL NODE BIOPSY (7:30)AM, BILATERAL SIMPLE MASTECTOMY, ULTRASOUND LATERALITY, RIGHT BREAST, 1 RADIOFREQUENCY TAG, AXILLA TARGET: LYMPH NODE CONTAINING BIOPSY MARKER (9-29-22) - Bilateral  General  . - Right, General  Panel 2  Plastics/Reconstructive  BILATERAL BREAST RECONSTRUCTION WITH DIRECT TO IMPLANT RECONSTRUCTION WITH ALLODERM - Bilateral, report received from TONI and Dr Ha Carpenter, reported hemodynamically stable intra op, all vitals stable upon arrival. Nikole Doing a wound vac in place with jacques drains x2.

## 2022-11-29 NOTE — PROGRESS NOTES
1140 Pt resting comfortably in bed,  with pt. Surgical bra in pace bilat wound vac dressings in place to each breast. No drainage noted to wound vac container. Bilat DYLAN sites to underarms clean dry and intact. Right DYLAN with more drainage than left, both to bulb suction. 1200 pt feeling nauseated, pt had small amt of emesis. 1210 pts IV off per PACU, LR restarted. 1218 zofran 4mg IVP given, pt resting. 1230 pt feeling better. D/C instructions given to pt and . 1245 pt ready for d/c, refusing anything po.   1300 pt up and dressed tolerated well. Pt denies need to urinate. Pt up to w/c, pt had small amt of emesis out after riding in w/c. Pt wanting to go home, pt home per . No problems noted, pt up and walked to car without problem. Ambulatory Surgery/Procedure Discharge Note    Vitals:    11/29/22 1119   BP: 128/79   Pulse: 81   Resp: 16   Temp: 97.7 °F (36.5 °C)   SpO2: 98%       In: 1125 [I.V.:1125]  Out: 345 [Urine:265; Drains:80]    Restroom use offered before discharge. Yes    Pain assessment:  present - adequately treated  Pain Level: 0        Patient discharged to home/self care.  Patient discharged via wheel chair by transporter to waiting family/S.O.       11/29/2022 1:18 PM

## 2022-11-29 NOTE — ANESTHESIA POSTPROCEDURE EVALUATION
Department of Anesthesiology  Postprocedure Note    Patient: Louise Botello  MRN: [id number]  YOB: 1985  Date of evaluation: 11/29/2022      Procedure Summary     Date: 11/29/22 Room / Location: [address]    Anesthesia Start: 1022 Anesthesia Stop: 1027    Procedures:       RIGHT BREAST SENTINEL NODE BIOPSY (7:30)AM, BILATERAL SIMPLE MASTECTOMY, ULTRASOUND LATERALITY, RIGHT BREAST, 1 RADIOFREQUENCY TAG, AXILLA TARGET: LYMPH NODE CONTAINING BIOPSY MARKER (9-29-22) (Bilateral)      . 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) [C77.3])      (Carcinoma of upper-outer quadrant of female breast, right ([address] [C50.411])      (PALB2-related breast cancer ([address] [C50.919, Z15.02, Z15.89, I18.23])      (Monoallelic mutation of NF1 gene [Z15.89])      (Carcinoma of right breast metastatic to axillary lymph node ([address] [C50.911, C77.3])      (Malignant neoplasm of right female breast, unspecified estrogen receptor status, unspecified site of breast ([address] Arian Ennist)    Surgeons: Delbert Restrepo MD; vIán Carlson MD Responsible Provider: Sayda Epstein MD    Anesthesia Type: general ASA Status: 1          Anesthesia Type: No value filed.     Merlyn Phase I: Merlyn Score: 10    Merlyn Phase II: Merlyn Score: 10      Anesthesia Post Evaluation    Patient location during evaluation: PACU  Patient participation: complete - patient participated  Level of consciousness: awake and alert  Airway patency: patent  Nausea & Vomiting: no nausea and no vomiting  Complications: no  Cardiovascular status: hemodynamically stable  Respiratory status: acceptable  Hydration status: euvolemic  Multimodal analgesia pain management approach

## 2022-11-29 NOTE — DISCHARGE INSTRUCTIONS
[facility]    Valdemar Montemayor MD  [address] [place] [address] LACEY Smith [address], 503 [facility]  [phone]    Post-op Instructions  ___________________________________________    Direct to Implant Breast Reconstruction    The following instructions will help you know what to expect in the days following your surgery. Do not, however, hesitate to call if you have questions or concerns. Activities    - Sleep in a flexed position with your head and shoulders elevated. Keep pillows under your knees for the first few days. You can resume your normal sleeping position when comfortable. - Driving is prohibited for 1 to 2 weeks. Do not drive while taking pain medications. - Avoid heavy lifting (no more than 5 pounds) and vigorous use of your arms for the first 3 weeks. - Do not engage in sports, aerobics, or vacuuming.   - Start arm raising exercises gently within 1 week of surgery. This will be discussed at your follow-up appointment. - Avoid heavy lifting >5 lb, bending, pushing, pulling, or straining   - Smoking (or ANY nicotine containing product) is prohibited for a minimum of 6 weeks as it interferes with healing and can lead to significant - and avoidable - complications  - You may need to use fiberfill or other padding on the opposite breast to maintain symmetry in clothing. Shoulder pads sometimes work nicely too. Diet  - Resume your preoperative diet as tolerated. Pain Control/Medications  - You will receive a prescription for pain medication that can be taken as directed if needed for pain control. The pain medication may cause constipation and does impair your ability to drive or make important decisions.          - You may also be given a muscle relaxant that can help with muscle spasms.  Do                  not take the pain medication and muscle relaxant at the same time        - There is absolutely NO driving while on pain medication or Valium® or Flexoril® . - Additionally do NOT take any of the following products:    Aspirin/low-dose aspirin    Ibuprofen (Advil®, Motrin®    Naprosyn or Naproxen (Aleve®)    Vitamin E (even small amounts in multiple vitamins)    Herbals or homeopathic medicines or green tea    Growth hormone    Diet pills (Meridia®, Metabolife®, etc)         - You will also be prescribed an antibiotic, make sure to complete the prescribed                    course. You can also take a probiotic yogurt (Activia®) to help with your bowel                 function while on these medications. - Take your medications as prescribed. Antibiotic to prevent infection:    Pain medication, if needed:    Valium to prevent muscle spasm:    Other: Lactobacillus (Culturelle) probiotic while taking antibiotics. You can    obtain this over the counter or within any yogurt that specifically has the active    ingredient: L. acidophilus (Lactobacillus acidophilus). Wound Care  - You will have some swelling or bruising of the breasts and upper abdomen. This is normal and will lessen over the next 1 to 3 weeks. - You may notice a change in sensation or numbness of the breast skin. This is common after surgery and should improve gradually over time. - You can resume daily showers in 24 hours, but avoid very hot water. You can allow soapy water to run over your breast and then after showering, pat the breast dry. Do not scrub or wipe the reconstructed breast until instructed (usually 6-8 weeks). - Do NOT submerge your incisions (e.g. no swimming or baths submerging breasts until instructed)  - Any drains may be attached to a belt or cloth strap while showering to prevent pulling.   - Drains are typically removed within 7 to 10 days after surgery, but may remain in place for longer. They are removed when drainage is less than 30 cc over a 24-hour period for 2 consecutive days. Careful recording is important.   Be sure to bring your recordings to your follow-up appointment(s). - You may have one of the two types of drain dressings:   1. Occlusive dressing: This dressing does not need to be changed unless the                white disc under the plastic (Tegaderm) covering becomes wet. If the white disc              does become wet, please wash hands before starting and have a clean work  surface. Remove the top covering and the disc then redress using the traditional              dressing. 2. Traditional gauze dressing: This dressing is to be changed once each day. Please wash hands before starting and have clean work surface area to open                  the dressing material on. Open the surgi-bra and remove the old dressing. Open  the packages of dressings and select the gauze with the slit; place the slit                        around the tube (where it comes out of your body). Take the other gauze and                   place it on top. Close the surgi-bra, this will hold the dressing in place. DO NOT               USE TAPE. - In the event of accidental drain removal, place a piece of dry gauze over the  drain site and contact your surgeon. - If your drain bulb loses suction or your drain has migrated out from the drain site,                do not attempt to push the drain back into your skin. Cover the area with dry                    gauze. You may be asked by your surgeon to place a piece of semi occlusive                  dressing (Tegaderm) over the drain site to keep the site clean and drain in place             until you are seen in the office.  - Use a gauze pad to protect your clothing from any oozing at the incision sites. - The incisions on your breasts may be red for at least 3 to 6 months. This is a normal healing ridge. The color will then begin to fade. It takes 1 year for scar maturation, but oftentimes revisions & planned staged surgeries occur prior to this.   - A bra should be worn at all times day and night after surgery. The bra should not be tight, have under wires, or have strong elastic. You will receive a surgical bra from the hospital stay as well as an additional clean bra to take home. Wear these bras for the first week until your follow-up appointment.  - Foam is to remain in place at all times until your postoperative appointment. You can remove this for showering and then reapply. Follow-up  Call your doctor's office at the first sign of:  Excessive worsening pain associated with pressure of the breast.   Enlargement of the breast area (eggplant color or worsening bruising). Bleeding at the incision. Redness, drainage or odor from the incisions. Fever or chills. Shortness of breath. Do not hesitate to call if you have any questions or concerns       [address]    There are potential side effects of anesthesia or sedation you may experience for the first 24 hours. These side effects include:    Confusion or Memory loss, Dizziness, or Delayed Reaction Times   [x]A responsible person should be with you for the next 24 hours. Do not operate any vehicles (automobiles, bicycles, motorcycles) or power tools or machinery for 24 hours. Do not sign any legal documents or make any legal decisions for 24 hours. Do not drink alcohol for 24 hours or while taking narcotic pain medication. Nausea    [x]Start with light diet and progress to your normal diet as you feel like eating. However, if you experience nausea or repeated episodes of vomiting which persist beyond 12-24 hours, notify your physician. Once nausea has passed, remember to keep drinking fluids. Difficulty Passing Urine  [x]Drink extra amounts of fluid today. Notify your physician if you have not urinated within 8 hours after your procedure or you feel uncomfortable. Irritated Throat from a Breathing Tube  [x]Drink extra amounts of fluid today. Lozenges may help. Muscle Aches  [x]You may experience some generalized body aches as your muscles recover from medications used to relax them during surgery. These will gradually subside. MEDICATION INSTRUCTIONS:  [x]Prescription(S) x    2 sent with you. Use as directed. When taking pain medications, you may experience the side effect of dizziness or drowsiness. Do not drink alcohol or drive when taking these medications. []Prescription(S) x          Called to Pharmacy Name and location:    [x]Give the list of your medications to your primary care physician on your next visit. Keep your med list updated and carry it with in case of emergencies. [x] Narcotic pain medications can cause the side effect of significant constipation. You may want to add a stool softener to your postoperative medication schedule or speak to your surgeon on how best to manage this side effect. NARCOTIC SAFETY:  Your pain medicine is only for you to take. Safely store your medicines. Store pills up high and out of reach of children and pets. Ensure safety caps are snapped tightly  Keep track of how many pills you have left    Unused medication can be disposed of by taking them to a drop-off box or take-back program that is authorized by the [place]. Access to a site near you can be found on the [facility] [employer] [facility] website ([address] [facility].gov). If you have a CPAP machine, it is very important that you use it daily during all periods of sleep and daytime rest during your recovery at home. Surgery and Anesthesia place a significant amount of stress on your body. Using your CPAP will help keep you safe and lessen the negative effects of that stress. FOLLOW-UP RECOVERY CARE:  [x]Call the office at [phone] for follow-up appointment and problems    Watch for these possible complications, symptoms, or side effects of anesthesia.   Call physician if they or any other problems occur:  Signs of INFECTION   > Fever over 101°     > Redness, swelling, hardness or warmth at the operative site   >Foul smelling or cloudy drainage at the operative site   Unrelieved PAIN  Unrelieved NAUSEA  Blood soaked dressing. (Some oozing may be normal)  Inability to urinate      Numb, pale, blue, cold or tingling extremity      Physician:  Mateo Mcknight     The above instructions were reviewed with patient/significant other. The following additional patient specific information was reviewed with the patient/significant other:  [x]Procedure/physician specific instructions  []Medication information sheet(S) including potential side effects  []Kattys egress test  []Pain Ball management  []FAQ Catheter associated blood stream infections  []FAQ Surgical Site Infections  []Other-    I have read and understand the instructions given to me: ____________________________________________   (Patient/S.O. Signature)            Date/time 11/29/2022 10:35 AM         PACU:  [phone]   M-F 700 AM - 7 PM      SAME DAY SERVICES:  [phone] M-F 7AM-6PM        If you smoke STOP. We care about your health! Your Surgeon or Anesthesiologist gave you Exparel     Exparel (bupivicaine liposome injectable suspension) is a medication injected into the surgical incision  just before the end of the procedure by your surgeon to help control your pain after surgery. It can also be given as a nerve block by the anesthesiologist. This local anesthetic provides pain relief by numbing the tissue around the surgical site. Exparel releases pain medication over time lasting up to 5 days or 120 hours. Exparel may cause a temporary loss of sensation or the ability to move in the area where the medication was injected. Side effects of Exparel that may occur include nausea, vomiting or constipation. Call immediately if you experience numbness or tingling of the mouth or lips, lightheadedness or severe anxiety.   Notify your physician if you experience these or any other possible side effects of the medication. Note: Other forms of bupivacaine should not be administered within 96 hours (4 days) following administration of Exparel. Please keep ID band in place for 96 hours (4 days).

## 2022-11-29 NOTE — ANESTHESIA PRE PROCEDURE
Department of Anesthesiology  Preprocedure Note       Name:  Chema Landon   Age:  40 y.o.  :  1985                                          MRN:  [id number]         Date:  2022      Surgeon: Fara Garcia):  MD Edelmira Mendoza MD    Procedure: Procedure(s):  RIGHT BREAST SENTINEL NODE BIOPSY (7:30)AM, BILATERAL SIMPLE MASTECTOMY, ULTRASOUND LATERALITY, RIGHT BREAST, 1 RADIOFREQUENCY TAG, AXILLA TARGET: LYMPH NODE CONTAINING BIOPSY MARKER (22)  . BILATERAL BREAST RECONSTRUCTION WITH DIRECT TO IMPLANT RECONSTRUCTION WITH ALLODERM, POSSIBLE STAGE 1 TISSUE EXPANDER PLACEMENT WITH ALLODERM    Medications prior to admission:   Prior to Admission medications    Medication Sig Start Date End Date Taking? Authorizing Provider   mupirocin (BACTROBAN) 2 % ointment Apply to nares twice a day for 5 days prior to surgery.  10/14/22   EMMA Iqbal - CNP       Current medications:    Current Facility-Administered Medications   Medication Dose Route Frequency Provider Last Rate Last Admin    ceFAZolin (ANCEF) 2,000 mg in sodium chloride 0.9 % 50 mL IVPB (mini-bag)  2,000 mg IntraVENous Once Edelmira New MD        tranexamic acid (CYKLOKAPRON) 1,000 mg in sodium chloride 0.9 % 60 mL IVPB  1,000 mg IntraVENous Once Edelmira New MD        lactated ringers infusion   IntraVENous Continuous Simeon Silver,  mL/hr at 22 0650 New Bag at 22 0650    sodium chloride flush 0.9 % injection 5-40 mL  5-40 mL IntraVENous 2 times per day Simeon Silver, DO        sodium chloride flush 0.9 % injection 5-40 mL  5-40 mL IntraVENous PRN Simeon Silver, DO        0.9 % sodium chloride infusion   IntraVENous PRN Simeon Silver, DO           Allergies:  No Known Allergies    Problem List:    Patient Active Problem List   Diagnosis Code    Poor venous access I87.8    Malignant neoplasm of upper-outer quadrant of right breast in female, estrogen receptor positive ([facility] C50.411, C92.5    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, 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, 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    History of chemotherapy Z92.21       Past Medical History:        Diagnosis Date    [facility]     [facility] 05/12/2022    right breast    Hx antineoplastic chemo        Past Surgical History:        Procedure Laterality Date    BACK SURGERY      scoliosis correction surgery at age 16   Shelva Sommer CHOLECYSTECTOMY  10/31/2014    PORT SURGERY Left 06/03/2022    LEFT SUBCLAVIAN PORT PLACEMENT performed by Ambar Carlos MD at [address] Right 05/12/2022    US BREAST NEEDLE BIOPSY RIGHT 5/12/2022 [address] [facility] ULTRASOUND    US GUIDED NEEDLE LOC OF RIGHT BREAST Right 09/29/2022    US GUIDED NEEDLE LOC OF RIGHT BREAST 9/29/2022 [address] [facility] ULTRASOUND    US LYMPH NODE BIOPSY  05/12/2022    US LYMPH NODE BIOPSY 5/12/2022 [address] [facility] ULTRASOUND       Social History:    Social History     Tobacco Use    Smoking status: Never    Smokeless tobacco: Never   Substance Use Topics    Alcohol use: No                                Counseling given: Not Answered      Vital Signs (Current):   Vitals:    11/29/22 0619   BP: (!) 109/51   Pulse: 81   Resp: 16   Temp: 98.3 °F (36.8 °C)   TempSrc: Temporal   SpO2: 99%   Weight: 121 lb 9.6 oz (55.2 kg)   Height: 5' 5\" (1.651 m)                                              BP Readings from Last 3 Encounters:   11/29/22 (!) 109/51   10/05/22 130/77   06/03/22 124/64       NPO Status: Time of last liquid consumption: 1815                        Time of last solid consumption: 1730                        Date of last liquid consumption: 11/28/22                        Date of last solid food consumption: 11/28/22    BMI:   Wt Readings from Last 3 Encounters:   11/29/22 121 lb 9.6 oz (55.2 kg)   10/05/22 125 lb 9.6 oz (57 kg)   09/29/22 125 lb (56.7 kg)     Body mass index is 20.24 kg/m².     CBC: No results found for: WBC, RBC, HGB, HCT, MCV, RDW, PLT    CMP: No results found for: NA, K, CL, CO2, BUN, CREATININE, GFRAA, AGRATIO, LABGLOM, GLUCOSE, GLU, PROT, CALCIUM, BILITOT, ALKPHOS, AST, ALT    POC Tests: No results for input(s): POCGLU, POCNA, POCK, POCCL, POCBUN, POCHEMO, POCHCT in the last 72 hours. Coags: No results found for: PROTIME, INR, APTT    HCG (If Applicable):   Lab Results   Component Value Date    PREGTESTUR Negative 11/29/2022        ABGs: No results found for: PHART, PO2ART, RYN4JSB, RSW5KSZ, BEART, U6TGBTDV     Type & Screen (If Applicable):  No results found for: LABABO, LABRH    Drug/Infectious Status (If Applicable):  No results found for: HIV, HEPCAB    COVID-19 Screening (If Applicable): No results found for: COVID19        Anesthesia Evaluation  Patient summary reviewed and Nursing notes reviewed no history of anesthetic complications:   Airway: Mallampati: II  TM distance: >3 FB   Neck ROM: full  Mouth opening: > = 3 FB   Dental: normal exam         Pulmonary:Negative Pulmonary ROS and normal exam                               Cardiovascular:Negative CV ROS                      Neuro/Psych:   Negative Neuro/Psych ROS              GI/Hepatic/Renal: Neg GI/Hepatic/Renal ROS            Endo/Other: Negative Endo/Other ROS                    Abdominal:             Vascular: negative vascular ROS. Other Findings:           Anesthesia Plan      general     ASA 1       Induction: intravenous. MIPS: Postoperative opioids intended and Prophylactic antiemetics administered. Anesthetic plan and risks discussed with patient. Plan discussed with CRNA.     Attending anesthesiologist reviewed and agrees with Preprocedure content                Alize aHrrington MD   11/29/2022

## 2022-11-29 NOTE — PROGRESS NOTES
PACU Transfer to [facility]    Vitals:    11/29/22 1100   BP: 118/74   Pulse: 78   Resp: 11   Temp:    SpO2: 97%         Intake/Output Summary (Last 24 hours) at 11/29/2022 1113  Last data filed at 11/29/2022 0949  Gross per 24 hour   Intake 1000 ml   Output 265 ml   Net 735 ml       Pain assessment:    Pain Level: 0    Patient transferred to care of [facility] RN.    11/29/2022 11:13 AM

## 2022-11-29 NOTE — OP NOTE
[facility] PLASTIC & RECONSTRUCTIVE SURGERY     OPERATIVE DICTATION    NAME: Marina Basilio   MRN: [id number]  DATE: 11/29/2022    AGE: 40 y.o. SURGEON: Gm Preston MD  ASSISTANT: Andrew Castro (PGY3)     BREAST SURGEON: Charisse Case MD    PREOPERATIVE DIAGNOSIS: Acquired absence bilateral breast, status post mastectomy   POSTOPERATIVE DIAGNOSIS: Same     OPERATION: 1) Immediate bilateral direct to implant breast reconstruction  2) Insertion of Alloderm Acellular Dermal Matrix (264cm^2))   3) Intraoperative SPY fluorescent angiography    4) Application of Prevena incisional vac    ANESTHESIA: General     ESTIMATED BLOOD LOSS: 50 mL (from plastics)    OPERATIVE INDICATIONS: This is a 40 y.o. female who underwent mastectomy for breast cancer with details listed in a separate operative dictation. Options of reconstruction were discussed with the patient and she opted for direct implant based options. The plan of surgery, risks, benefits, alternatives, indications, limitations, possible complications, and future surgeries were discussed with the patient and she agreed to proceed. OPERATIVE PROCEDURE: The patient was marked in the standing position in the preoperative holding area. She was then brought to the operating room and placed in the supine position on the operating table. After satisfactory induction with general endotracheal anesthesia, the patient was then prepped and draped in the usual sterile fashion. Breast surgery commenced by performing their mastectomy. Details of the procedure are listed in a separate operative dictation. Plastic surgery then scrubbed and obtained hemostasis on the left breast initially with electrocautery. Prepectoral reconstruction was performed by measuring the breast size after hemostasis was obtained and the wound copiously irrigated. An Exparel breast block was performed and then and multiple breast sizers were placed to ensure appropriate size.   Once the appropriate size was selected, SPY fluorescent angiography was performed revealing some areas of decreased perfusion, but adequate perfusion for direct implant reconstruction. Two contour, sheets of Alloderm ADM were then utilized to wrap the implant using 2-0 PDS suture after being placed in antibiotic solution itself. The pocket was then copiously irrigated with triple antibiotic solution and the implant was placed into the pocket and secured along the IMF using 2-0 PDS sutures. A 15F drain was then brought out from a separate stab incision and secured in place using 3-0 Nylon suture. There was no evidence of tension on the mastectomy skin flaps. The wound was then closed in layers using 3-0 Monocryl suture. Attention was then directed toward the contralateral right side and the same procedure was performed. Prepectoral reconstruction was performed by measuring the breast size after hemostasis was obtained and the wound copiously irrigated. An Exparel breast block was performed and then and multiple breast sizers were placed to ensure appropriate size. Two contour, perforated sheets of Alloderm ADM were then utilized to wrap the implant using 2-0 PDS suture after being placed in antibiotic solution itself. The pocket was then copiously irrigated with triple antibiotic solution and the implant was placed into the pocket and secured along the IMF using 2-0 PDS sutures. A 15F drain was then brought out from a separate stab incision and secured in place using 3-0 Nylon suture. There was no evidence of tension on the mastectomy skin flaps. The wound was then closed in layers using 3-0 Monocryl suture. A completion SPY image was then performed revealing satisfactory perfusion overall. [facility] incisional vac was applied to both breasts to offload tension on the incision given the oncologic need for removal of the breast as well as right breast superomedial lesion.     The patient was then awakened, extubated, and taken to the PACU in stable condition. At the end of the case, all counts were correct and there were no immediate complications. The patient tolerated the procedure well. DRAINS: 2 (15F in each breast)    SPECIMEN: None from plastics    CONDITION: Stable    MASTECTOMY SPECIMEN WEIGHT: R 235 grams; L 235 grams    IMPLANTS:   (Right) 210 cc [place] MemoryGel, Smooth, Round, Moderate Profile BOOST Breast Implant, reference# SMPB-210, lot# [id number]. (Right) Alloderm RTU, contour, perforated; size (264 cm^2); ref# [id number], lot# [id number] & [id number]. (Left)   210 cc [employer] MemoryGel, Smooth, Round, Moderate Profile BOOST Breast Implant, reference# SMPB-210, lot# [id number]. (Left)  Alloderm RTU, contour, perforated; size (264 cm^2)); ref# [id number], lot# [id number] & [id number].     Carrillo Wills MD

## 2022-11-29 NOTE — H&P
[address] Same Day Surgery Update H & P  Department of General Surgery   Surgical Service   Pre-operative History and Physical  Last H & P within the last 30 days. 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 BILATERAL BREAST RECONSTRUCTION WITH DIRECT TO IMPLANT RECONSTRUCTION WITH ALLODERM, POSSIBLE STAGE 1 TISSUE EXPANDER PLACEMENT WITH ALLODERM    History obtained from: Patient interview and EHR      HISTORY OF PRESENT ILLNESS:   The patient is a 40 y.o. female with biopsy demonstrated  right breast cancer with metastasis to the lymph node presents today for the above procedures with Michelle Busby and Chuyita Waite. Illness Screening: Patient denies fever, chills, worsening cough, or close contact with sick individuals.         Past Medical History:        Diagnosis Date    Breast cancer ([facility]     Cancer ([facility] 05/12/2022    right breast    Hx antineoplastic chemo      Past Surgical History:        Procedure Laterality Date    BACK SURGERY      scoliosis correction surgery at age 16    CHOLECYSTECTOMY  10/31/2014    PORT SURGERY Left 06/03/2022    LEFT SUBCLAVIAN PORT PLACEMENT performed by Janie Robertson MD at [address] [facility] Right 05/12/2022    US BREAST NEEDLE BIOPSY RIGHT 5/12/2022 [address] [place] [address] GUIDED NEEDLE LOC OF RIGHT BREAST Right 09/29/2022    US GUIDED NEEDLE LOC OF RIGHT BREAST 9/29/2022 [facility]    US LYMPH NODE BIOPSY  05/12/2022    US LYMPH NODE BIOPSY 5/12/2022 [facility]TRAS[facility]       Medications Prior to Admission:      Prior to Admission medications    Medication Sig Start Date End Date Taking? Authorizing Provider   mupirocin (BACTROBAN) 2 % ointment Apply to nares twice a day for 5 days prior to surgery. 10/14/22   EMMA Iqbal CNP         Allergies:  Patient has no known allergies. PHYSICAL EXAM:      BP (!) 109/51   Pulse 81   Temp 98.3 °F (36.8 °C) (Temporal)   Resp 16   Ht 5' 5\" (1.651 m)   Wt 121 lb 9.6 oz (55.2 kg)   SpO2 99%   BMI 20.24 kg/m²      Airway:  Airway patent with no audible stridor    Heart:  Regular rate and rhythm, No murmur noted    Lungs:  No increased work of breathing, good air exchange, clear to auscultation bilaterally, no crackles or wheezing    Abdomen:  Soft, non-distended, non-tender, no rebound tenderness or guarding, and no masses palpated    ASSESSMENT AND PLAN     Patient is a 40 y.o. female with above specified procedure planned. 1.  The patients history and physical was obtained and signed off by the pre-admission testing department. Patient seen and focused exam done today- no new changes since last physical exam on 11/15/22    2. Access to ancillary services are available per request of the provider.     EMMA Gaspar CNP     11/29/2022

## 2022-11-29 NOTE — H&P
The patient was identified and examined. The surgical site was marked by Dr Poretr Baer. No interval changes in health status since H&P was performed. The patient is cleared to proceed as scheduled.

## 2022-11-29 NOTE — BRIEF OP NOTE
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 BILATERAL BREAST RECONSTRUCTION WITH DIRECT TO IMPLANT RECONSTRUCTION WITH ALLODERM    Surgeon(s):  MD Radhames Vaz MD Ronney Patches, MD    Assistant:  Surgical Assistant: Luiz Barry;  Yifan Peoples RN  Resident: Evangelista Tamayo DO    Anesthesia: General    Estimated Blood Loss (mL): less than 50     Complications: None    Specimens:   ID Type Source Tests Collected by Time Destination   A : A. SENTINEL NODE #1 RIGHT AXILLA  (COUNT: 219, LEVEL 1, NOT BLUE, FROZEN) Tissue Tissue SURGICAL PATHOLOGY Adry Quevedo MD 11/29/2022 0804    B : Prasanna Boykin #2 RIGHT AXILLA  (NOT BLUE, COUNT: 970) Tissue Tissue SURGICAL PATHOLOGY Adry Quevedo MD 11/29/2022 4457    C : Prasanna Boykin #3 RIGHT AXILLA (COUNT: 7422, NOT BLUE, FROZEN, SUTURE MARKS RADIOACTIVE NODE) Tissue Tissue SURGICAL PATHOLOGY Adry Quevedo MD 11/29/2022 7215    D : D. RIGHT BREAST Tissue Tissue SURGICAL PATHOLOGY Adry Quevedo MD 11/29/2022 3036    E : E. LEFT BREAST  Tissue Tissue SURGICAL PATHOLOGY Adry Quevedo MD 11/29/2022 0935        Implants:  Implant Name Type Inv. Item Serial No.  Lot No. LRB No. Used Action   GRAFT HUM TISS THK2-2.8MM DR PREETI QUINN formerly [facility] - [id number]  GRAFT HUM TISS [address] [id number] Right 1 Implanted   GRAFT HUM TISS THK2-2.8MM [facility] Ephriam Screen [id number]  GRAFT HUM TISS [address] [id number] Right 1 Implanted   GRAFT HUM TISS THK2-2.8MM [address] [id number]  GRAFT HUM TISS [address] [id number] Left 1 Implanted   GRAFT HUM TISS THK2-2.8MM [facility] Ephriam Screen [id number]  GRAFT HUM TISS [place] [address] [address] - [employer]- [id number] Left 1 Implanted         Drains:   Closed/Suction Drain Inferior; Lateral;Right Breast Bulb (Active)       Closed/Suction Drain Inferior; Lateral;Left Breast Bulb (Active)       Negative Pressure Wound Therapy Breast Right (Active)       Negative Pressure Wound Therapy Breast Left; Lower (Active)       [REMOVED] Urinary Catheter 11/29/22 Martinez (Removed)       Findings:   Bilateral mastectomy with R SLNBx and bilateral DTI recon.     Electronically signed by Samantha Vu DO on 11/29/2022 at 10:30 AM

## 2022-11-30 NOTE — OP NOTE
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 BILATERAL BREAST RECONSTRUCTION WITH DIRECT TO IMPLANT RECONSTRUCTION WITH ALLODERM    Surgeon(s):  MD Carrillo Ford MD Olden Dole, MD    Assistant:   Surgical Assistant: Reynaldo Tran RN  Resident: Dede Hillman DO    Anesthesia: General    Estimated Blood Loss (mL): 440     Complications: None    Specimens:   ID Type Source Tests Collected by Time Destination   A : A. SENTINEL NODE #1 RIGHT AXILLA  (COUNT: 382, LEVEL 1, NOT BLUE, FROZEN) Tissue Tissue SURGICAL PATHOLOGY Michael Jacobs MD 11/29/2022 0804    B : Lonzell Po NODE #2 RIGHT AXILLA  (NOT BLUE, COUNT: 970) Tissue Tissue SURGICAL PATHOLOGY Michael Jacobs MD 11/29/2022 5386    C : [address] #3 RIGHT AXILLA (COUNT: 3704, NOT BLUE, FROZEN, SUTURE MARKS RADIOACTIVE NODE) Tissue Tissue SURGICAL PATHOLOGY Michael Jacobs MD 11/29/2022 5369    D : D. RIGHT BREAST Tissue Tissue SURGICAL PATHOLOGY Michael Jacobs MD 11/29/2022 2049    E : E. LEFT BREAST  Tissue Tissue SURGICAL PATHOLOGY Michael Jacobs MD 11/29/2022 0935        Implants:  Implant Name Type Inv. Item Serial No.  Lot No. LRB No. Used Action   GRAFT HUM TISS THK2-2.8MM DR PREETI QUINN [facility] - [id number]  GRAFT HUM TISS [facility] [address] [id number] Right 1 Implanted   GRAFT HUM TISS THK2-2.8MM [facility] Yun [id number]  GRAFT HUM TISS THK2-2.8MM [address] [id number] Right 1 Implanted   GRAFT HUM TISS THK2-2.8MM [place] - [id number]  GRAFT HUM TISS THK2-2.8MM [address] [id number] Left 1 Implanted   GRAFT HUM TISS THK2-2.8MM [facility] WILLIE CARLO March[place] [id number]  GRAFT HUM TISS THK2-2.8MM [address] [id number] Left 1 Implanted   IMPL BREAST GEL BOOST SMTH MOD + PROF 210CC - [id number]  IMPL BREAST GEL BOOST SMTH MOD + PROF 210CC [id number] [id number]  Right 1 Implanted   IMPL BREAST GEL BOOST SMTH MOD + PROF 210CC - [id number]  IMPL BREAST GEL BOOST SMTH MOD + PROF 210CC [id number] [employer]  Left 1 Implanted         Drains:   Closed/Suction Drain Inferior; Lateral;Right Breast Bulb (Active)   Site Description Clean, dry & intact 11/29/22 1244   Dressing Status Clean, dry & intact 11/29/22 1244   Drainage Appearance Dark Red 11/29/22 1244   Drain Status To bulb suction 11/29/22 1244   Output (ml) 55 ml 11/29/22 1244       Closed/Suction Drain Inferior; Lateral;Left Breast Bulb (Active)   Site Description Clean, dry & intact 11/29/22 1244   Dressing Status Clean, dry & intact 11/29/22 1244   Drainage Appearance Serous 11/29/22 1244   Drain Status To bulb suction 11/29/22 1244   Output (ml) 25 ml 11/29/22 1244       Negative Pressure Wound Therapy Breast Right (Active)   Wound Type Surgical 11/29/22 1107   Dressing Type Other (Comment) 11/29/22 1244   Cycle Continuous 11/29/22 1107   Target Pressure (mmHg) 125 11/29/22 1107   Dressing Status Clean, dry & intact 11/29/22 1107   Drainage Amount None 11/29/22 1107   Output (ml) 0 ml 11/29/22 1244       Negative Pressure Wound Therapy Breast Left; Lower (Active)       [REMOVED] Urinary Catheter 11/29/22 Martinez (Removed)       Findings: [place] node negative for malignancy    Indications the patient is a 44-year-old woman who had undergone neoadjuvant chemotherapy for large invasive ductal carcinoma of the right breast.  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 Detailed Description of Procedure: Patient was taken to the operating room and placed in the supine position. She had previously undergone radiofrequency tag localization of the previously biopsied right axillary node which was positive for malignancy. After induction of general anesthesia, the patient underwent periareolar injection of Lymphoseek and subareolar injection of isosulfan blue in the right breast.  The breast was massaged for several minutes to promote lymphatic uptake. Both breasts and axilla were then prepped and draped in the usual sterile fashion. We began on the right by making a small axillary incision and extended the incision through the subcutaneous tissues where we were able to easily identify a radioactive node containing the radiofrequency tag. The node was located level 1 with a count of 963 and no blue dye. The specimen radiograph confirmed the location of both the radiofrequency tag and the previous biopsy marker within the sentinel node. We identified a second radioactive node with a count of 970. The node was also not blue frozen section was deferred sentinel node #3 contained radioactive node with a count of 1827. Frozen section was negative for malignancy. At that point we concluded the sentinel node biopsy.   We turned our attention to the right breast and created a periareolar incision as indicated by Dr. Ayah Humphrey. We then proceeded with radial dissection of the flaps to the level of the sternum clavicle latissimus dorsi and inframammary fold. The breast parenchyma and pectoralis fashion were then dissected in a superior to inferior direction. The specimen was oriented using a margin map weighed and submitted in formalin for permanent section. The surgical site was irrigated inspected for hemostasis. We then turned our attention to the right breast and proceeded with right mastectomy as described for the contralateral breast.  The specimen was once again oriented using a margin map weighed and submitted in formalin for permanent section. Care of the patient was then transferred to Dr. Yossi Mary for implant reconstruction. Estimated blood loss at the conclusion of the first part of the operation was approximately 200 mL. Second part of the operation is dictated under separate cover by Dr. Ayah Humphrey.       Electronically signed by Antonio Uribe MD on 11/29/2022 at 9:07 PM

## 2022-12-01 ENCOUNTER — TELEPHONE (OUTPATIENT)
Dept: SURGERY | Age: 37
End: 2022-12-01

## 2022-12-01 NOTE — TELEPHONE ENCOUNTER
I spoke with patient this AM.  Patient  stated they are doing well after surgery. Patient stated that pain is manageable and their intake is adequate. Postoperative instructions reinforced. All questions answered. Instructed that if the Prevena wound vac stops holding suction or is continuously alarming to call the office ASAP. Patient confirmed that they would call with any issues or problems. Follow up scheduled on 12/06/2022.

## 2022-12-06 ENCOUNTER — OFFICE VISIT (OUTPATIENT)
Dept: SURGERY | Age: 37
End: 2022-12-06

## 2022-12-06 VITALS
BODY MASS INDEX: 20.14 KG/M2 | TEMPERATURE: 97.2 F | OXYGEN SATURATION: 98 % | HEART RATE: 80 BPM | SYSTOLIC BLOOD PRESSURE: 114 MMHG | DIASTOLIC BLOOD PRESSURE: 65 MMHG | WEIGHT: 121 LBS

## 2022-12-06 DIAGNOSIS — Z09 POSTOP CHECK: Primary | ICD-10-CM

## 2022-12-06 PROCEDURE — 99024 POSTOP FOLLOW-UP VISIT: CPT

## 2022-12-06 NOTE — PROGRESS NOTES
[facility]    PROCEDURE: 1) Immediate bilateral direct to implant breast reconstruction  2) Insertion of Alloderm Acellular Dermal Matrix (264cm^2))   3) Intraoperative SPY fluorescent angiography                          4) Application of Prevena incisional vac  DATE: 11/29/22    Jacobo William has been recovering well since her procedure. Pain has been well controlled with the prescribed pain management regimen. She presents to the office with 1 week prevena wound vac in place. EXAM    /65   Pulse 80   Temp 97.2 °F (36.2 °C)   Wt 121 lb (54.9 kg)   SpO2 98%   BMI 20.14 kg/m²       GEN: NAD   BREAST: Incision healing appropriately. No hematoma/seroma. Good contour. IMP: 40 y. o.female s/p B DTI  PLAN: Doing well overall. Wound vac removed.  Will follow up in 1 week for potential drain removal.     Janice Maria, APRN - [id number]construct[facility]  [phone]  12/06/22

## 2022-12-14 ENCOUNTER — OFFICE VISIT (OUTPATIENT)
Dept: SURGERY | Age: 37
End: 2022-12-14

## 2022-12-14 VITALS
TEMPERATURE: 97.3 F | OXYGEN SATURATION: 99 % | WEIGHT: 121 LBS | HEIGHT: 65 IN | HEART RATE: 75 BPM | BODY MASS INDEX: 20.16 KG/M2

## 2022-12-14 DIAGNOSIS — Z09 POSTOP CHECK: Primary | ICD-10-CM

## 2022-12-14 PROCEDURE — 99024 POSTOP FOLLOW-UP VISIT: CPT

## 2022-12-14 NOTE — PROGRESS NOTES
[facility]    PROCEDURE: 1) Immediate bilateral direct to implant breast reconstruction  2) Insertion of Alloderm Acellular Dermal Matrix (264cm^2))   3) Intraoperative SPY fluorescent angiography                          4) Application of Prevena incisional vac  DATE: 11/29/22    Marina Basilio has been recovering well since her procedure. Pain has been well controlled with the prescribed pain management regimen. She presents to the office for a bilateral drain removal.     EXAM  Pulse 75   Temp 97.3 °F (36.3 °C)   Ht 5' 5\" (1.651 m)   Wt 121 lb (54.9 kg)   SpO2 99%   BMI 20.14 kg/m²      GEN: NAD   BREAST: Incision healing appropriately. No hematoma/seroma. Good contour. IMP: 40 y. o.female s/p B DTI  PLAN: Doing well overall. Bilateral drain removal. Will follow up in 1 month to ensure wound healing. Will call with any concerns in the interim.      Carley Go, APRN - [facility] Reconstructive Surgery  [phone]  12/14/22

## 2023-01-11 NOTE — PROGRESS NOTES
[facility]    PROCEDURE: 1) Immediate bilateral direct to implant breast reconstruction  2) Insertion of Alloderm Acellular Dermal Matrix (264cm^2))   3) Intraoperative SPY fluorescent angiography                          4) Application of Prevena incisional vac  DATE: 11/29/22    Tracey Rodríguez has been recovering well since her procedure. Pain has been well controlled with the prescribed pain management regimen. She presents to the office for a one month post op. She is scheduled to begin radiation therapy later today. EXAM  /70   Pulse 75   Temp 98.5 °F (36.9 °C)   Wt 121 lb (54.9 kg)   SpO2 98%   BMI 20.14 kg/m²      GEN: NAD   BREAST: Incision healing appropriately. No hematoma/seroma. Good contour. IMP: 40 y. o.female s/p B DTI  PLAN: Doing well overall. Will follow up 6 months after radiation. Will call with any concerns in the interim.      Reta Cortez, APRN - [facility]construct[facility] Surgery  [phone]  01/11/23

## 2023-01-12 ENCOUNTER — OFFICE VISIT (OUTPATIENT)
Dept: SURGERY | Age: 38
End: 2023-01-12

## 2023-01-12 VITALS
BODY MASS INDEX: 20.14 KG/M2 | TEMPERATURE: 98.5 F | DIASTOLIC BLOOD PRESSURE: 70 MMHG | WEIGHT: 121 LBS | SYSTOLIC BLOOD PRESSURE: 114 MMHG | HEART RATE: 75 BPM | OXYGEN SATURATION: 98 %

## 2023-01-12 DIAGNOSIS — Z09 POSTOP CHECK: Primary | ICD-10-CM

## 2023-01-12 PROCEDURE — 99024 POSTOP FOLLOW-UP VISIT: CPT

## 2023-07-20 ENCOUNTER — HOSPITAL ENCOUNTER (OUTPATIENT)
Dept: MRI IMAGING | Age: 38
Discharge: HOME | End: 2023-07-20
Payer: COMMERCIAL

## 2023-07-20 DIAGNOSIS — C50.411 MALIGNANT NEOPLASM OF UPPER-OUTER QUADRANT OF RIGHT FEMALE BREAST, UNSPECIFIED ESTROGEN RECEPTOR STATUS (HCC): ICD-10-CM

## 2023-07-20 PROCEDURE — 6360000004 HC RX CONTRAST MEDICATION: Performed by: INTERNAL MEDICINE

## 2023-07-20 PROCEDURE — A9579 GAD-BASE MR CONTRAST NOS,1ML: HCPCS | Performed by: INTERNAL MEDICINE

## 2023-07-20 PROCEDURE — 74183 MRI ABD W/O CNTR FLWD CNTR: CPT

## 2023-07-20 RX ADMIN — GADOTERIDOL 11 ML: 279.3 INJECTION, SOLUTION INTRAVENOUS at 15:32

## 2023-08-23 ENCOUNTER — OFFICE VISIT (OUTPATIENT)
Dept: SURGERY | Age: 38
End: 2023-08-23
Payer: COMMERCIAL

## 2023-08-23 VITALS
HEART RATE: 75 BPM | WEIGHT: 124 LBS | DIASTOLIC BLOOD PRESSURE: 82 MMHG | TEMPERATURE: 98.3 F | SYSTOLIC BLOOD PRESSURE: 130 MMHG | BODY MASS INDEX: 20.63 KG/M2 | OXYGEN SATURATION: 98 %

## 2023-08-23 DIAGNOSIS — Z09 POSTOP CHECK: Primary | ICD-10-CM

## 2023-08-23 PROCEDURE — 1036F TOBACCO NON-USER: CPT | Performed by: SURGERY

## 2023-08-23 PROCEDURE — G8420 CALC BMI NORM PARAMETERS: HCPCS | Performed by: SURGERY

## 2023-08-23 PROCEDURE — G8427 DOCREV CUR MEDS BY ELIG CLIN: HCPCS | Performed by: SURGERY

## 2023-08-23 PROCEDURE — 99213 OFFICE O/P EST LOW 20 MIN: CPT | Performed by: SURGERY

## 2023-08-23 NOTE — PROGRESS NOTES
[facility]    PROCEDURE: 1) Immediate bilateral direct to implant breast reconstruction  2) Insertion of Alloderm Acellular Dermal Matrix (264cm^2))   3) Intraoperative SPY fluorescent angiography                          4) Application of Prevena incisional vac  DATE: 11/29/23    Esperanza De Guzman has been recovering well since her procedure. Pain has been well controlled without pain medications. She has completed radiation 6 months ago and presents back for evaluation. PMHx:   Past Medical History:   Diagnosis Date    Breast cancer ([address])     Cancer ([address]) 05/12/2022    right breast    Hx antineoplastic chemo      PSHx:   Past Surgical History:   Procedure Laterality Date    BACK SURGERY      scoliosis correction surgery at age 16    BREAST ENHANCEMENT SURGERY Bilateral 11/29/2022    BILATERAL BREAST RECONSTRUCTION WITH DIRECT TO IMPLANT RECONSTRUCTION WITH ALLODERM performed by Arturo Stallings MD at [facility] Right 11/29/2022    .  performed by Zainab Crisostomo MD at [address] [place] [address]  10/31/2014    MASTECTOMY Bilateral 11/29/2022    RIGHT BREAST SENTINEL NODE BIOPSY (7:30)AM, BILATERAL SIMPLE MASTECTOMY, ULTRASOUND LATERALITY, RIGHT BREAST, 1 RADIOFREQUENCY TAG, AXILLA TARGET: LYMPH NODE CONTAINING BIOPSY MARKER (9-29-22) performed by Zainab Crisostomo MD at [address] Left 06/03/2022    LEFT SUBCLAVIAN PORT PLACEMENT performed by Zainab Cirsostomo MD at [address] [facility] Right 05/12/2022    US BREAST NEEDLE BIOPSY RIGHT 5/12/2022 [address] [facility]TR[facility]    US GUIDED NEEDLE LOC OF RIGHT BREAST Right 09/29/2022    US GUIDED NEEDLE LOC OF RIGHT BREAST 9/29/2022 [address] [facility]TR[facility]    US LYMPH NODE BIOPSY  05/12/2022    US LYMPH NODE BIOPSY 5/12/2022 [address] [facility]     Allergy: No Known Allergies    SHx:   Social History     Socioeconomic History    Marital status:      Spouse name: Not on file

## 2024-10-01 NOTE — PROGRESS NOTES
[facility]    PROCEDURE: 1) Immediate bilateral direct to implant breast reconstruction  2) Insertion of Alloderm Acellular Dermal Matrix (264cm^2))   3) Intraoperative SPY fluorescent angiography                          4) Application of Prevena incisional vac  DATE: 11/29/23    Kimber Diaz has been recovering well since her procedure. Pain has been well controlled without pain medications. She has completed radiation 6 months ago and presents back for evaluation.    Since her last evaluation, she notes contracture on the right with good contour on the left.    PMHx:   Past Medical History:   Diagnosis Date    Breast cancer (HCC)     Cancer (HCC) 05/12/2022    right breast    Hx antineoplastic chemo      PSHx:   Past Surgical History:   Procedure Laterality Date    BACK SURGERY      scoliosis correction surgery at age 14    BREAST ENHANCEMENT SURGERY Bilateral 11/29/2022    BILATERAL BREAST RECONSTRUCTION WITH DIRECT TO IMPLANT RECONSTRUCTION WITH ALLODERM performed by Gabriele Torrez MD at [facility] OR    BREAST SURGERY Right 11/29/2022    . performed by Ariella Ojeda MD at [facility] OR    CHOLECYSTECTOMY  10/31/2014    MASTECTOMY Bilateral 11/29/2022    RIGHT BREAST SENTINEL NODE BIOPSY (7:30)AM, BILATERAL SIMPLE MASTECTOMY, ULTRASOUND LATERALITY, RIGHT BREAST, 1 RADIOFREQUENCY TAG, AXILLA TARGET: LYMPH NODE CONTAINING BIOPSY MARKER (9-29-22) performed by Ariella Ojeda MD at [facility] OR    PORT SURGERY Left 06/03/2022    LEFT SUBCLAVIAN PORT PLACEMENT performed by Ariella Ojeda MD at [facility] OR    US BREAST BIOPSY W LOC DEVICE 1ST LESION RIGHT Right 05/12/2022    US BREAST NEEDLE BIOPSY RIGHT 5/12/2022 [facility]    US GUIDED NEEDLE LOC OF RIGHT BREAST Right 09/29/2022    US GUIDED NEEDLE LOC OF RIGHT BREAST 9/29/2022 [facility]TR[facility]    US LYMPH NODE BIOPSY  05/12/2022    US LYMPH NODE BIOPSY 5/12/2022 [facility]TR[facility]     Allergy: No Known Allergies    SHx:   Social

## 2024-10-02 ENCOUNTER — OFFICE VISIT (OUTPATIENT)
Dept: SURGERY | Age: 39
End: 2024-10-02
Payer: COMMERCIAL

## 2024-10-02 VITALS
HEART RATE: 70 BPM | OXYGEN SATURATION: 98 % | SYSTOLIC BLOOD PRESSURE: 102 MMHG | BODY MASS INDEX: 22.96 KG/M2 | DIASTOLIC BLOOD PRESSURE: 69 MMHG | TEMPERATURE: 98.4 F | WEIGHT: 138 LBS

## 2024-10-02 DIAGNOSIS — C50.911 MALIGNANT NEOPLASM OF RIGHT FEMALE BREAST, UNSPECIFIED ESTROGEN RECEPTOR STATUS, UNSPECIFIED SITE OF BREAST (HCC): Primary | ICD-10-CM

## 2024-10-02 PROCEDURE — 99214 OFFICE O/P EST MOD 30 MIN: CPT | Performed by: SURGERY

## 2025-03-24 ENCOUNTER — HOSPITAL ENCOUNTER (OUTPATIENT)
Dept: CT IMAGING | Age: 40
Discharge: HOME | End: 2025-03-24
Attending: SURGERY
Payer: COMMERCIAL

## 2025-03-24 DIAGNOSIS — C50.911 MALIGNANT NEOPLASM OF RIGHT FEMALE BREAST, UNSPECIFIED ESTROGEN RECEPTOR STATUS, UNSPECIFIED SITE OF BREAST: ICD-10-CM

## 2025-03-24 PROCEDURE — 6360000004 HC RX CONTRAST MEDICATION: Performed by: SURGERY

## 2025-03-24 PROCEDURE — 74174 CTA ABD&PLVS W/CONTRAST: CPT

## 2025-03-24 RX ORDER — IOPAMIDOL 755 MG/ML
100 INJECTION, SOLUTION INTRAVASCULAR
Status: COMPLETED | OUTPATIENT
Start: 2025-03-24 | End: 2025-03-24

## 2025-03-24 RX ADMIN — IOPAMIDOL 100 ML: 755 INJECTION, SOLUTION INTRAVENOUS at 08:27

## 2025-03-25 ENCOUNTER — RESULTS FOLLOW-UP (OUTPATIENT)
Dept: SURGERY | Age: 40
End: 2025-03-25

## 2025-03-27 NOTE — TELEPHONE ENCOUNTER
----- Message from Dr. Gabriele Torrez MD sent at 3/25/2025  7:05 PM EDT -----  Left side with 2 good perforators for ZOE reconstruction.    Thanks,  NK

## 2025-04-22 NOTE — PROGRESS NOTES
9/29/2022 [facility]TR[facility]     Allergy: No Known Allergies    SHx:   Social History     Socioeconomic History    Marital status:      Spouse name: Not on file    Number of children: Not on file    Years of education: Not on file    Highest education level: Not on file   Occupational History    Not on file   Tobacco Use    Smoking status: Never    Smokeless tobacco: Never   Vaping Use    Vaping status: Never Used   Substance and Sexual Activity    Alcohol use: No    Drug use: Never    Sexual activity: Not on file   Other Topics Concern    Not on file   Social History Narrative    Not on file     Social Drivers of Health     Financial Resource Strain: Not on file   Food Insecurity: Unknown (1/22/2024)    Received from [employer] [facility] [employer]    Food Insecurities     Worried about running out of food: Not on file     Food Bought: Not on file   Transportation Needs: Unknown (1/22/2024)    Received from [employer] [facility] [employer]    Transportation     Worried about transportation: Not on file   Physical Activity: Not on file   Stress: Not on file   Social Connections: Not on file   Intimate Partner Violence: Unknown (1/22/2024)    Received from [employer] [facility] [employer]    Interpersonal Safety     Feel physically or emotionally unsafe where currently live: Not on file     Harm by anyone: Not on file     Emotionally Harmed: Not on file   Housing Stability: Unknown (1/22/2024)    Received from [employer] [facility] [employer]    Housing/Utilities     Worried about losing home: Not on file     Stayed outside house: Not on file     Unable to get utilities: Not on file     FHx: Family history reviewed, but patient is unsure if there has been anyone with breast CA    Meds:   No current outpatient medications on file.     No current facility-administered medications for this visit.       ROS   Constitutional: Negative for chills and fever.   HENT: Negative for

## 2025-04-23 ENCOUNTER — OFFICE VISIT (OUTPATIENT)
Dept: SURGERY | Age: 40
End: 2025-04-23
Payer: COMMERCIAL

## 2025-04-23 VITALS
OXYGEN SATURATION: 99 % | DIASTOLIC BLOOD PRESSURE: 77 MMHG | HEART RATE: 68 BPM | BODY MASS INDEX: 23.6 KG/M2 | SYSTOLIC BLOOD PRESSURE: 119 MMHG | RESPIRATION RATE: 16 BRPM | WEIGHT: 141.8 LBS

## 2025-04-23 DIAGNOSIS — C50.911 MALIGNANT NEOPLASM OF RIGHT FEMALE BREAST, UNSPECIFIED ESTROGEN RECEPTOR STATUS, UNSPECIFIED SITE OF BREAST (HCC): Primary | ICD-10-CM

## 2025-04-23 PROCEDURE — 99214 OFFICE O/P EST MOD 30 MIN: CPT | Performed by: SURGERY

## 2025-04-23 RX ORDER — SODIUM CHLORIDE 0.9 % (FLUSH) 0.9 %
5-40 SYRINGE (ML) INJECTION PRN
OUTPATIENT
Start: 2025-04-23

## 2025-04-23 RX ORDER — SODIUM CHLORIDE, SODIUM LACTATE, POTASSIUM CHLORIDE, CALCIUM CHLORIDE 600; 310; 30; 20 MG/100ML; MG/100ML; MG/100ML; MG/100ML
INJECTION, SOLUTION INTRAVENOUS CONTINUOUS
OUTPATIENT
Start: 2025-04-23

## 2025-04-23 RX ORDER — SODIUM CHLORIDE 9 MG/ML
INJECTION, SOLUTION INTRAVENOUS PRN
OUTPATIENT
Start: 2025-04-23

## 2025-04-23 RX ORDER — ANASTROZOLE 1 MG/1
TABLET ORAL
COMMUNITY

## 2025-04-23 RX ORDER — SODIUM CHLORIDE 0.9 % (FLUSH) 0.9 %
5-40 SYRINGE (ML) INJECTION EVERY 12 HOURS SCHEDULED
OUTPATIENT
Start: 2025-04-23

## 2025-04-23 RX ORDER — HEPARIN SODIUM 5000 [USP'U]/ML
5000 INJECTION, SOLUTION INTRAVENOUS; SUBCUTANEOUS
OUTPATIENT
Start: 2025-04-23 | End: 2025-04-23

## 2025-04-24 ENCOUNTER — TELEPHONE (OUTPATIENT)
Dept: SURGERY | Age: 40
End: 2025-04-24

## 2025-04-24 NOTE — TELEPHONE ENCOUNTER
The patient was in the office to see Dr. Torrez yesterday.    PLAN: Doing well overall. We discussed potentially going larger and we will follow-up in 1 year.  We discussed the risks in the setting of radiation. She is interested in ZOE reconstruction and we had an extensive discussion - especially in the setting of radiation. Will plan for unilateral, R ZOE reconstruction.  Will schedule.     I received a surgery letter.    I lmom for the patient at the home number listed. I requested a call back to discuss a possible surgery date. I will need the date prior to submitting to insurance since it is an ADMIT.    I will leave this phone note open.

## 2025-05-19 NOTE — TELEPHONE ENCOUNTER
I returned the patients call mentioned below. She is aware that the dates she provided are not available. I offered several possible dates and will wait to hear back from her to move forward with insurance.     I will leave this phone note open.

## 2025-05-19 NOTE — TELEPHONE ENCOUNTER
Spoke with patient and she stated that she wanted to give Zuleyma some dates that she would be available to have the surgery done.    June 19, 2025   July 10, 2025  July 17, 2025    Patient stated that if she is called during the hours of 10:30 am and 7:00 pm she is at work on the phones and that a detailed message could be left on her voicemail if she isn't able to answer the call.    Patient can be reached at [phone]

## 2025-05-20 NOTE — TELEPHONE ENCOUNTER
I lmom for the patient at the home number listed. I advised that I am now able to schedule her surgery on 7-. I asked for a call back to let me know that the date and time work for her.      I will leave this phone note open.

## 2025-05-20 NOTE — TELEPHONE ENCOUNTER
I spoke with Laura RICHMOND at [place] ([phone]) to start pre certification for CPT Code 32528, 73202, 73055 and ADMIT on 7-. I will fax clinicals to [phone]. I will scan the clinicals that I faxed and the fax success into Epic under the media tab.     Pending Case # [id number]    Time HELD 7-    I lmom for the patient at the home number listed to provide an insurance update.     I will leave this phone note open.

## 2025-06-06 NOTE — TELEPHONE ENCOUNTER
I received a fax from [employer] dated 6-4-2025. I will scan the fax into Epic under the media tab.    APPROVED  Reference Number [id number]  [facility]  CPT Code 34749, 39859  Inpatient Stay 3 Days     I received a fax from [employer] dated 6-4-2025. I will scan the fax into Epic under the media tab.    APPROVED  Reference Number [id number]  [facility]  CPT Code 74843    I spoke with the patient at the home number listed to discuss insurance and surgery scheduling. We discussed a possible surgery date of 7-. She will determine if this works and call me back.    I will leave this phone note open.

## 2025-06-12 NOTE — TELEPHONE ENCOUNTER
I lmom for the patient at the home number listed. I requested a call back to follow up on a possible surgery date of 7-.      I will need to call [employer] to change the date on the approval letter once the surgery is scheduled.      I will leave this phone note open.

## 2025-06-17 NOTE — TELEPHONE ENCOUNTER
I lmom for the patient at the home number listed. I requested a call back to follow up on a possible surgery date of 7-.     weakness, confusion, memory issue, tremors as in HPI

## 2025-06-18 NOTE — TELEPHONE ENCOUNTER
Patient called back in stating that she would like to have her surgery on 7/25/25, but if that date is taken she is available any time after 7/25/2025     Please contact the patient at  [phone]     Patient states that she starts work at 10:30 am each day

## 2025-06-19 ENCOUNTER — PREP FOR PROCEDURE (OUTPATIENT)
Dept: SURGERY | Age: 40
End: 2025-06-19

## 2025-06-19 PROBLEM — C50.911 MALIGNANT NEOPLASM OF RIGHT BREAST (HCC): Status: ACTIVE | Noted: 2025-06-19

## 2025-06-19 NOTE — TELEPHONE ENCOUNTER
I spoke with the patient at the home number listed. The patient is now scheduled for surgery with  on 7-.     The patient is aware of H&P.    The patient will call the office once discharged form [facility] to schedule her post op appointment.    I will submit the case request to [facility].     I will email the surgery information and instructions to the patient today at [email or web address]     I will fax a date change request to Yuko bansal at [phone]. I will scan the information that I faxed and the fax success into Epic under the media tab.     APPROVED  Reference Number [id number]  [facility]  CPT Code 48947, 33534  Inpatient Stay 3 Days   Date Range ?     I will leave this phone note open.

## 2025-07-14 ENCOUNTER — OFFICE VISIT (OUTPATIENT)
Dept: SURGERY | Age: 40
End: 2025-07-14
Payer: COMMERCIAL

## 2025-07-14 VITALS
DIASTOLIC BLOOD PRESSURE: 77 MMHG | BODY MASS INDEX: 23.99 KG/M2 | WEIGHT: 144 LBS | SYSTOLIC BLOOD PRESSURE: 119 MMHG | HEIGHT: 65 IN | HEART RATE: 67 BPM

## 2025-07-14 DIAGNOSIS — C50.911 MALIGNANT NEOPLASM OF RIGHT FEMALE BREAST, UNSPECIFIED ESTROGEN RECEPTOR STATUS, UNSPECIFIED SITE OF BREAST (HCC): Primary | ICD-10-CM

## 2025-07-14 PROCEDURE — 99214 OFFICE O/P EST MOD 30 MIN: CPT

## 2025-07-14 NOTE — PROGRESS NOTES
[facility]    PROCEDURE: 1) Immediate bilateral direct to implant breast reconstruction  2) Insertion of Alloderm Acellular Dermal Matrix (264cm^2))   3) Intraoperative SPY fluorescent angiography                          4) Application of Prevena incisional vac  DATE: 11/29/23    Kimber Diaz has been recovering well since her procedure. Pain has been well controlled without pain medications. She has completed radiation 6 months ago and presents back for evaluation. She notes contracture on the right with good contour on the left and presents back for discussion of options after completing her CTA.    Since patients last evaluation she denies any new medical conditions or concerns, she presents today for pre-op and post-op education.     PMHx:   Past Medical History:   Diagnosis Date    Breast cancer (HCC)     Cancer (HCC) 05/12/2022    right breast    Hx antineoplastic chemo      PSHx:   Past Surgical History:   Procedure Laterality Date    BACK SURGERY      scoliosis correction surgery at age 14    BREAST ENHANCEMENT SURGERY Bilateral 11/29/2022    BILATERAL BREAST RECONSTRUCTION WITH DIRECT TO IMPLANT RECONSTRUCTION WITH ALLODERM performed by Gabriele Torrez MD at [facility] OR    BREAST SURGERY Right 11/29/2022    . performed by Ariella Ojeda MD at [facility] OR    CHOLECYSTECTOMY  10/31/2014    MASTECTOMY Bilateral 11/29/2022    RIGHT BREAST SENTINEL NODE BIOPSY (7:30)AM, BILATERAL SIMPLE MASTECTOMY, ULTRASOUND LATERALITY, RIGHT BREAST, 1 RADIOFREQUENCY TAG, AXILLA TARGET: LYMPH NODE CONTAINING BIOPSY MARKER (9-29-22) performed by Ariella Ojeda MD at [facility] OR    PORT SURGERY Left 06/03/2022    LEFT SUBCLAVIAN PORT PLACEMENT performed by Ariella Ojeda MD at [facility] OR    US BIOPSY LYMPH NODE  05/12/2022    US LYMPH NODE BIOPSY 5/12/2022 [facility] ULTRASOUND    US BREAST BIOPSY W LOC DEVICE 1ST LESION RIGHT Right 05/12/2022    US BREAST NEEDLE BIOPSY RIGHT 5/12/2022 [facility] ULTRASOUND

## 2025-07-22 ENCOUNTER — ANESTHESIA EVENT (OUTPATIENT)
Dept: OPERATING ROOM | Age: 40
End: 2025-07-22
Payer: COMMERCIAL

## 2025-07-23 RX ORDER — SODIUM CHLORIDE 0.9 % (FLUSH) 0.9 %
5-40 SYRINGE (ML) INJECTION EVERY 12 HOURS SCHEDULED
Status: CANCELLED | OUTPATIENT
Start: 2025-07-25

## 2025-07-23 RX ORDER — SODIUM CHLORIDE 9 MG/ML
INJECTION, SOLUTION INTRAVENOUS PRN
Status: CANCELLED | OUTPATIENT
Start: 2025-07-25

## 2025-07-23 RX ORDER — HEPARIN SODIUM 5000 [USP'U]/ML
5000 INJECTION, SOLUTION INTRAVENOUS; SUBCUTANEOUS
Status: CANCELLED | OUTPATIENT
Start: 2025-07-25 | End: 2025-07-25

## 2025-07-23 RX ORDER — SODIUM CHLORIDE 0.9 % (FLUSH) 0.9 %
5-40 SYRINGE (ML) INJECTION PRN
Status: CANCELLED | OUTPATIENT
Start: 2025-07-25

## 2025-07-23 RX ORDER — SODIUM CHLORIDE, SODIUM LACTATE, POTASSIUM CHLORIDE, CALCIUM CHLORIDE 600; 310; 30; 20 MG/100ML; MG/100ML; MG/100ML; MG/100ML
INJECTION, SOLUTION INTRAVENOUS CONTINUOUS
Status: CANCELLED | OUTPATIENT
Start: 2025-07-25

## 2025-07-25 ENCOUNTER — ANESTHESIA (OUTPATIENT)
Dept: OPERATING ROOM | Age: 40
End: 2025-07-25
Payer: COMMERCIAL

## 2025-07-25 ENCOUNTER — HOSPITAL ENCOUNTER (INPATIENT)
Age: 40
LOS: 3 days | Discharge: HOME | DRG: 582 | End: 2025-07-28
Attending: SURGERY | Admitting: SURGERY
Payer: COMMERCIAL

## 2025-07-25 DIAGNOSIS — G89.18 POST-OP PAIN: Primary | ICD-10-CM

## 2025-07-25 DIAGNOSIS — C50.911 MALIGNANT NEOPLASM OF RIGHT BREAST (HCC): ICD-10-CM

## 2025-07-25 PROBLEM — C50.919 BREAST CANCER IN FEMALE (HCC): Status: ACTIVE | Noted: 2025-07-25

## 2025-07-25 LAB — HCG UR QL: NEGATIVE

## 2025-07-25 PROCEDURE — 2000000000 HC ICU R&B

## 2025-07-25 PROCEDURE — 84703 CHORIONIC GONADOTROPIN ASSAY: CPT

## 2025-07-25 PROCEDURE — 6360000002 HC RX W HCPCS: Performed by: SURGERY

## 2025-07-25 PROCEDURE — 2500000003 HC RX 250 WO HCPCS: Performed by: NURSE ANESTHETIST, CERTIFIED REGISTERED

## 2025-07-25 PROCEDURE — 3700000000 HC ANESTHESIA ATTENDED CARE: Performed by: SURGERY

## 2025-07-25 PROCEDURE — 2720000010 HC SURG SUPPLY STERILE: Performed by: SURGERY

## 2025-07-25 PROCEDURE — 3600000014 HC SURGERY LEVEL 4 ADDTL 15MIN: Performed by: SURGERY

## 2025-07-25 PROCEDURE — C1729 CATH, DRAINAGE: HCPCS | Performed by: SURGERY

## 2025-07-25 PROCEDURE — P9045 ALBUMIN (HUMAN), 5%, 250 ML: HCPCS | Performed by: NURSE ANESTHETIST, CERTIFIED REGISTERED

## 2025-07-25 PROCEDURE — 0HRT077 REPLACEMENT OF RIGHT BREAST USING DEEP INFERIOR EPIGASTRIC ARTERY PERFORATOR FLAP, OPEN APPROACH: ICD-10-PCS | Performed by: SURGERY

## 2025-07-25 PROCEDURE — 19371 PERI-IMPLT CAPSLC BRST COMPL: CPT | Performed by: SURGERY

## 2025-07-25 PROCEDURE — C1889 IMPLANT/INSERT DEVICE, NOC: HCPCS | Performed by: SURGERY

## 2025-07-25 PROCEDURE — 2500000003 HC RX 250 WO HCPCS: Performed by: SURGERY

## 2025-07-25 PROCEDURE — 6370000000 HC RX 637 (ALT 250 FOR IP): Performed by: SURGERY

## 2025-07-25 PROCEDURE — 0WUF0JZ SUPPLEMENT ABDOMINAL WALL WITH SYNTHETIC SUBSTITUTE, OPEN APPROACH: ICD-10-PCS | Performed by: SURGERY

## 2025-07-25 PROCEDURE — 2709999900 HC NON-CHARGEABLE SUPPLY: Performed by: SURGERY

## 2025-07-25 PROCEDURE — 2580000003 HC RX 258: Performed by: SURGERY

## 2025-07-25 PROCEDURE — 0HRT076 REPLACEMENT OF RIGHT BREAST USING TRANSVERSE RECTUS ABDOMINIS MYOCUTANEOUS FLAP, OPEN APPROACH: ICD-10-PCS | Performed by: SURGERY

## 2025-07-25 PROCEDURE — 88305 TISSUE EXAM BY PATHOLOGIST: CPT

## 2025-07-25 PROCEDURE — 19364 BRST RCNSTJ FREE FLAP: CPT | Performed by: SURGERY

## 2025-07-25 PROCEDURE — 49592 RPR AA HRN 1ST < 3 NCR/STRN: CPT | Performed by: SURGERY

## 2025-07-25 PROCEDURE — C1781 MESH (IMPLANTABLE): HCPCS | Performed by: SURGERY

## 2025-07-25 PROCEDURE — 3700000001 HC ADD 15 MINUTES (ANESTHESIA): Performed by: SURGERY

## 2025-07-25 PROCEDURE — L8000 MASTECTOMY BRA: HCPCS | Performed by: SURGERY

## 2025-07-25 PROCEDURE — 7100000011 HC PHASE II RECOVERY - ADDTL 15 MIN

## 2025-07-25 PROCEDURE — 88300 SURGICAL PATH GROSS: CPT

## 2025-07-25 PROCEDURE — 3600000004 HC SURGERY LEVEL 4 BASE: Performed by: SURGERY

## 2025-07-25 PROCEDURE — 6360000002 HC RX W HCPCS: Performed by: NURSE ANESTHETIST, CERTIFIED REGISTERED

## 2025-07-25 PROCEDURE — 0HPT0JZ REMOVAL OF SYNTHETIC SUBSTITUTE FROM RIGHT BREAST, OPEN APPROACH: ICD-10-PCS | Performed by: SURGERY

## 2025-07-25 PROCEDURE — 15860 IV NJX TST VASC FLO FLAP/GRF: CPT | Performed by: SURGERY

## 2025-07-25 PROCEDURE — 2580000003 HC RX 258: Performed by: ANESTHESIOLOGY

## 2025-07-25 PROCEDURE — C1760 CLOSURE DEV, VASC: HCPCS | Performed by: SURGERY

## 2025-07-25 PROCEDURE — 7100000010 HC PHASE II RECOVERY - FIRST 15 MIN

## 2025-07-25 PROCEDURE — 6360000002 HC RX W HCPCS: Performed by: ANESTHESIOLOGY

## 2025-07-25 DEVICE — THE GEM MICROVASCULAR ANASTOMOTIC COUPLER DEVICE RINGS ARE MADE OF POLYETHYLENE AND SURGICAL GRADE STAINLESS STEEL PINS. A PROTECTIVE COVER AND JAW ASSEMBLY PROTECT THE RINGS AND ALLOW FOR EASY LOADING ONTO THE ANASTOMOTIC INSTRUMENT. BOTH THE PROTECTIVE COVER AND JAW ASSEMBLY ARE DISPOSABLE. THE GEM MICROVASCULAR ANASTOMOTIC COUPLER DEVICE IS SINGLE-USE AND AVAILABLE IN VARIOUS SIZES
Type: IMPLANTABLE DEVICE | Site: BREAST | Status: FUNCTIONAL
Brand: GEM MICROVASCULAR ANASTOMOTIC COUPLER

## 2025-07-25 DEVICE — MESH SURG W6XL8IN RECT FULL REABSORBABLE FOR SFT TISS RECON: Type: IMPLANTABLE DEVICE | Status: FUNCTIONAL

## 2025-07-25 DEVICE — HORIZON TI MED 24 CLIPS/POUCH
Type: IMPLANTABLE DEVICE | Site: ABDOMEN | Status: FUNCTIONAL
Brand: WECK

## 2025-07-25 RX ORDER — FENTANYL CITRATE 50 UG/ML
25 INJECTION, SOLUTION INTRAMUSCULAR; INTRAVENOUS EVERY 5 MIN PRN
Status: DISCONTINUED | OUTPATIENT
Start: 2025-07-25 | End: 2025-07-25

## 2025-07-25 RX ORDER — PROCHLORPERAZINE EDISYLATE 5 MG/ML
5 INJECTION INTRAMUSCULAR; INTRAVENOUS
Status: DISCONTINUED | OUTPATIENT
Start: 2025-07-25 | End: 2025-07-25

## 2025-07-25 RX ORDER — GLYCOPYRROLATE 0.2 MG/ML
INJECTION INTRAMUSCULAR; INTRAVENOUS
Status: DISCONTINUED | OUTPATIENT
Start: 2025-07-25 | End: 2025-07-25 | Stop reason: SDUPTHER

## 2025-07-25 RX ORDER — MORPHINE SULFATE 4 MG/ML
4 INJECTION, SOLUTION INTRAMUSCULAR; INTRAVENOUS
Status: DISCONTINUED | OUTPATIENT
Start: 2025-07-25 | End: 2025-07-28

## 2025-07-25 RX ORDER — SODIUM CHLORIDE 0.9 % (FLUSH) 0.9 %
5-40 SYRINGE (ML) INJECTION EVERY 12 HOURS SCHEDULED
Status: DISCONTINUED | OUTPATIENT
Start: 2025-07-25 | End: 2025-07-28 | Stop reason: HOSPADM

## 2025-07-25 RX ORDER — LIDOCAINE HYDROCHLORIDE AND EPINEPHRINE 10; 10 MG/ML; UG/ML
INJECTION, SOLUTION INFILTRATION; PERINEURAL PRN
Status: DISCONTINUED | OUTPATIENT
Start: 2025-07-25 | End: 2025-07-25 | Stop reason: ALTCHOICE

## 2025-07-25 RX ORDER — SODIUM CHLORIDE 0.9 % (FLUSH) 0.9 %
5-40 SYRINGE (ML) INJECTION PRN
Status: DISCONTINUED | OUTPATIENT
Start: 2025-07-25 | End: 2025-07-25 | Stop reason: HOSPADM

## 2025-07-25 RX ORDER — SODIUM CHLORIDE 0.9 % (FLUSH) 0.9 %
5-40 SYRINGE (ML) INJECTION EVERY 12 HOURS SCHEDULED
Status: DISCONTINUED | OUTPATIENT
Start: 2025-07-25 | End: 2025-07-25

## 2025-07-25 RX ORDER — SODIUM CHLORIDE 9 MG/ML
INJECTION, SOLUTION INTRAVENOUS PRN
Status: DISCONTINUED | OUTPATIENT
Start: 2025-07-25 | End: 2025-07-25

## 2025-07-25 RX ORDER — MORPHINE SULFATE 2 MG/ML
2 INJECTION, SOLUTION INTRAMUSCULAR; INTRAVENOUS
Status: DISCONTINUED | OUTPATIENT
Start: 2025-07-25 | End: 2025-07-28

## 2025-07-25 RX ORDER — PAPAVERINE HYDROCHLORIDE 30 MG/ML
INJECTION INTRAMUSCULAR; INTRAVENOUS PRN
Status: DISCONTINUED | OUTPATIENT
Start: 2025-07-25 | End: 2025-07-25 | Stop reason: ALTCHOICE

## 2025-07-25 RX ORDER — HEPARIN SODIUM 5000 [USP'U]/ML
5000 INJECTION, SOLUTION INTRAVENOUS; SUBCUTANEOUS EVERY 8 HOURS SCHEDULED
Status: DISCONTINUED | OUTPATIENT
Start: 2025-07-26 | End: 2025-07-28 | Stop reason: HOSPADM

## 2025-07-25 RX ORDER — MAGNESIUM HYDROXIDE 1200 MG/15ML
LIQUID ORAL CONTINUOUS PRN
Status: COMPLETED | OUTPATIENT
Start: 2025-07-25 | End: 2025-07-25

## 2025-07-25 RX ORDER — SODIUM CHLORIDE 0.9 % (FLUSH) 0.9 %
5-40 SYRINGE (ML) INJECTION PRN
Status: DISCONTINUED | OUTPATIENT
Start: 2025-07-25 | End: 2025-07-25

## 2025-07-25 RX ORDER — PROPOFOL 10 MG/ML
INJECTION, EMULSION INTRAVENOUS
Status: DISCONTINUED | OUTPATIENT
Start: 2025-07-25 | End: 2025-07-25 | Stop reason: SDUPTHER

## 2025-07-25 RX ORDER — ONDANSETRON 2 MG/ML
4 INJECTION INTRAMUSCULAR; INTRAVENOUS EVERY 6 HOURS PRN
Status: DISCONTINUED | OUTPATIENT
Start: 2025-07-25 | End: 2025-07-28 | Stop reason: HOSPADM

## 2025-07-25 RX ORDER — ACETAMINOPHEN 325 MG/1
650 TABLET ORAL EVERY 4 HOURS PRN
Status: DISCONTINUED | OUTPATIENT
Start: 2025-07-25 | End: 2025-07-28 | Stop reason: HOSPADM

## 2025-07-25 RX ORDER — ONDANSETRON 4 MG/1
4 TABLET, ORALLY DISINTEGRATING ORAL EVERY 8 HOURS PRN
Status: DISCONTINUED | OUTPATIENT
Start: 2025-07-25 | End: 2025-07-28 | Stop reason: HOSPADM

## 2025-07-25 RX ORDER — SODIUM CHLORIDE 0.9 % (FLUSH) 0.9 %
5-40 SYRINGE (ML) INJECTION EVERY 12 HOURS SCHEDULED
Status: DISCONTINUED | OUTPATIENT
Start: 2025-07-25 | End: 2025-07-25 | Stop reason: HOSPADM

## 2025-07-25 RX ORDER — OXYCODONE HYDROCHLORIDE 5 MG/1
5 TABLET ORAL EVERY 4 HOURS PRN
Status: DISCONTINUED | OUTPATIENT
Start: 2025-07-25 | End: 2025-07-28 | Stop reason: HOSPADM

## 2025-07-25 RX ORDER — SODIUM CHLORIDE 9 MG/ML
INJECTION, SOLUTION INTRAVENOUS PRN
Status: DISCONTINUED | OUTPATIENT
Start: 2025-07-25 | End: 2025-07-25 | Stop reason: HOSPADM

## 2025-07-25 RX ORDER — FAMOTIDINE 10 MG/ML
INJECTION, SOLUTION INTRAVENOUS
Status: DISCONTINUED | OUTPATIENT
Start: 2025-07-25 | End: 2025-07-25 | Stop reason: SDUPTHER

## 2025-07-25 RX ORDER — SODIUM CHLORIDE, SODIUM LACTATE, POTASSIUM CHLORIDE, CALCIUM CHLORIDE 600; 310; 30; 20 MG/100ML; MG/100ML; MG/100ML; MG/100ML
INJECTION, SOLUTION INTRAVENOUS CONTINUOUS
Status: DISCONTINUED | OUTPATIENT
Start: 2025-07-25 | End: 2025-07-25 | Stop reason: HOSPADM

## 2025-07-25 RX ORDER — ONDANSETRON 2 MG/ML
INJECTION INTRAMUSCULAR; INTRAVENOUS
Status: DISCONTINUED | OUTPATIENT
Start: 2025-07-25 | End: 2025-07-25 | Stop reason: SDUPTHER

## 2025-07-25 RX ORDER — DIPHENHYDRAMINE HYDROCHLORIDE 50 MG/ML
12.5 INJECTION, SOLUTION INTRAMUSCULAR; INTRAVENOUS
Status: DISCONTINUED | OUTPATIENT
Start: 2025-07-25 | End: 2025-07-25

## 2025-07-25 RX ORDER — APREPITANT 40 MG/1
40 CAPSULE ORAL ONCE
Status: COMPLETED | OUTPATIENT
Start: 2025-07-25 | End: 2025-07-25

## 2025-07-25 RX ORDER — IPRATROPIUM BROMIDE AND ALBUTEROL SULFATE 2.5; .5 MG/3ML; MG/3ML
1 SOLUTION RESPIRATORY (INHALATION)
Status: DISCONTINUED | OUTPATIENT
Start: 2025-07-25 | End: 2025-07-25

## 2025-07-25 RX ORDER — METHOCARBAMOL 100 MG/ML
INJECTION, SOLUTION INTRAMUSCULAR; INTRAVENOUS
Status: DISCONTINUED | OUTPATIENT
Start: 2025-07-25 | End: 2025-07-25 | Stop reason: SDUPTHER

## 2025-07-25 RX ORDER — SODIUM CHLORIDE 0.9 % (FLUSH) 0.9 %
5-40 SYRINGE (ML) INJECTION PRN
Status: DISCONTINUED | OUTPATIENT
Start: 2025-07-25 | End: 2025-07-28 | Stop reason: HOSPADM

## 2025-07-25 RX ORDER — OXYCODONE HYDROCHLORIDE 5 MG/1
10 TABLET ORAL EVERY 4 HOURS PRN
Status: DISCONTINUED | OUTPATIENT
Start: 2025-07-25 | End: 2025-07-28 | Stop reason: HOSPADM

## 2025-07-25 RX ORDER — MEPERIDINE HYDROCHLORIDE 25 MG/ML
12.5 INJECTION INTRAMUSCULAR; INTRAVENOUS; SUBCUTANEOUS EVERY 5 MIN PRN
Status: DISCONTINUED | OUTPATIENT
Start: 2025-07-25 | End: 2025-07-25

## 2025-07-25 RX ORDER — ONDANSETRON 2 MG/ML
4 INJECTION INTRAMUSCULAR; INTRAVENOUS
Status: DISCONTINUED | OUTPATIENT
Start: 2025-07-25 | End: 2025-07-25

## 2025-07-25 RX ORDER — ROCURONIUM BROMIDE 10 MG/ML
INJECTION, SOLUTION INTRAVENOUS
Status: DISCONTINUED | OUTPATIENT
Start: 2025-07-25 | End: 2025-07-25 | Stop reason: SDUPTHER

## 2025-07-25 RX ORDER — HYDROMORPHONE HYDROCHLORIDE 1 MG/ML
0.5 INJECTION, SOLUTION INTRAMUSCULAR; INTRAVENOUS; SUBCUTANEOUS EVERY 5 MIN PRN
Status: DISCONTINUED | OUTPATIENT
Start: 2025-07-25 | End: 2025-07-25

## 2025-07-25 RX ORDER — ALBUMIN HUMAN 50 G/1000ML
SOLUTION INTRAVENOUS
Status: DISCONTINUED | OUTPATIENT
Start: 2025-07-25 | End: 2025-07-25 | Stop reason: SDUPTHER

## 2025-07-25 RX ORDER — LABETALOL HYDROCHLORIDE 5 MG/ML
10 INJECTION, SOLUTION INTRAVENOUS
Status: DISCONTINUED | OUTPATIENT
Start: 2025-07-25 | End: 2025-07-25

## 2025-07-25 RX ORDER — LIDOCAINE HYDROCHLORIDE 40 MG/ML
INJECTION, SOLUTION RETROBULBAR PRN
Status: DISCONTINUED | OUTPATIENT
Start: 2025-07-25 | End: 2025-07-25 | Stop reason: ALTCHOICE

## 2025-07-25 RX ORDER — HEPARIN SODIUM 5000 [USP'U]/ML
5000 INJECTION, SOLUTION INTRAVENOUS; SUBCUTANEOUS
Status: COMPLETED | OUTPATIENT
Start: 2025-07-25 | End: 2025-07-25

## 2025-07-25 RX ORDER — SODIUM CHLORIDE 9 MG/ML
INJECTION, SOLUTION INTRAVENOUS PRN
Status: DISCONTINUED | OUTPATIENT
Start: 2025-07-25 | End: 2025-07-28 | Stop reason: HOSPADM

## 2025-07-25 RX ORDER — SODIUM CHLORIDE, SODIUM LACTATE, POTASSIUM CHLORIDE, CALCIUM CHLORIDE 600; 310; 30; 20 MG/100ML; MG/100ML; MG/100ML; MG/100ML
INJECTION, SOLUTION INTRAVENOUS CONTINUOUS
Status: ACTIVE | OUTPATIENT
Start: 2025-07-25 | End: 2025-07-26

## 2025-07-25 RX ORDER — METOCLOPRAMIDE HYDROCHLORIDE 5 MG/ML
INJECTION INTRAMUSCULAR; INTRAVENOUS
Status: DISCONTINUED | OUTPATIENT
Start: 2025-07-25 | End: 2025-07-25 | Stop reason: SDUPTHER

## 2025-07-25 RX ORDER — LIDOCAINE HYDROCHLORIDE 20 MG/ML
INJECTION, SOLUTION INTRAVENOUS
Status: DISCONTINUED | OUTPATIENT
Start: 2025-07-25 | End: 2025-07-25 | Stop reason: SDUPTHER

## 2025-07-25 RX ORDER — KETAMINE HCL IN NACL, ISO-OSM 20 MG/2 ML
SYRINGE (ML) INJECTION
Status: DISCONTINUED | OUTPATIENT
Start: 2025-07-25 | End: 2025-07-25 | Stop reason: SDUPTHER

## 2025-07-25 RX ORDER — OXYCODONE HYDROCHLORIDE 5 MG/1
5 TABLET ORAL
Status: DISCONTINUED | OUTPATIENT
Start: 2025-07-25 | End: 2025-07-25

## 2025-07-25 RX ADMIN — FAMOTIDINE 20 MG: 10 INJECTION, SOLUTION INTRAVENOUS at 07:33

## 2025-07-25 RX ADMIN — SODIUM CHLORIDE, PRESERVATIVE FREE 10 ML: 5 INJECTION INTRAVENOUS at 20:08

## 2025-07-25 RX ADMIN — ONDANSETRON 8 MG: 2 INJECTION, SOLUTION INTRAMUSCULAR; INTRAVENOUS at 07:32

## 2025-07-25 RX ADMIN — ROCURONIUM BROMIDE 50 MG: 10 INJECTION, SOLUTION INTRAVENOUS at 12:46

## 2025-07-25 RX ADMIN — SODIUM CHLORIDE, SODIUM LACTATE, POTASSIUM CHLORIDE, AND CALCIUM CHLORIDE: .6; .31; .03; .02 INJECTION, SOLUTION INTRAVENOUS at 16:24

## 2025-07-25 RX ADMIN — LIDOCAINE HYDROCHLORIDE 100 MG: 20 INJECTION, SOLUTION INTRAVENOUS at 07:35

## 2025-07-25 RX ADMIN — SODIUM CHLORIDE, SODIUM LACTATE, POTASSIUM CHLORIDE, AND CALCIUM CHLORIDE: .6; .31; .03; .02 INJECTION, SOLUTION INTRAVENOUS at 08:50

## 2025-07-25 RX ADMIN — GLYCOPYRROLATE 0.2 MG: 0.2 INJECTION INTRAMUSCULAR; INTRAVENOUS at 08:51

## 2025-07-25 RX ADMIN — GLYCOPYRROLATE 0.2 MG: 0.2 INJECTION INTRAMUSCULAR; INTRAVENOUS at 07:47

## 2025-07-25 RX ADMIN — OXYCODONE 5 MG: 5 TABLET ORAL at 20:05

## 2025-07-25 RX ADMIN — PROPOFOL 150 MG: 10 INJECTION, EMULSION INTRAVENOUS at 07:35

## 2025-07-25 RX ADMIN — PROPOFOL 100 MG: 10 INJECTION, EMULSION INTRAVENOUS at 15:04

## 2025-07-25 RX ADMIN — ROCURONIUM BROMIDE 50 MG: 10 INJECTION, SOLUTION INTRAVENOUS at 11:16

## 2025-07-25 RX ADMIN — GLYCOPYRROLATE 0.2 MG: 0.2 INJECTION INTRAMUSCULAR; INTRAVENOUS at 13:25

## 2025-07-25 RX ADMIN — APREPITANT 40 MG: 40 CAPSULE ORAL at 07:01

## 2025-07-25 RX ADMIN — SODIUM CHLORIDE, SODIUM LACTATE, POTASSIUM CHLORIDE, AND CALCIUM CHLORIDE: .6; .31; .03; .02 INJECTION, SOLUTION INTRAVENOUS at 07:50

## 2025-07-25 RX ADMIN — SODIUM CHLORIDE, SODIUM LACTATE, POTASSIUM CHLORIDE, AND CALCIUM CHLORIDE: .6; .31; .03; .02 INJECTION, SOLUTION INTRAVENOUS at 10:07

## 2025-07-25 RX ADMIN — SUGAMMADEX 200 MG: 100 INJECTION, SOLUTION INTRAVENOUS at 15:22

## 2025-07-25 RX ADMIN — ALBUMIN (HUMAN) 250 ML: 12.5 SOLUTION INTRAVENOUS at 11:11

## 2025-07-25 RX ADMIN — GLYCOPYRROLATE 0.2 MG: 0.2 INJECTION INTRAMUSCULAR; INTRAVENOUS at 11:46

## 2025-07-25 RX ADMIN — CEFAZOLIN SODIUM 2000 MG: 1 POWDER, FOR SOLUTION INTRAMUSCULAR; INTRAVENOUS at 07:51

## 2025-07-25 RX ADMIN — Medication 20 MG: at 07:35

## 2025-07-25 RX ADMIN — ROCURONIUM BROMIDE 50 MG: 10 INJECTION, SOLUTION INTRAVENOUS at 10:26

## 2025-07-25 RX ADMIN — ROCURONIUM BROMIDE 50 MG: 10 INJECTION, SOLUTION INTRAVENOUS at 09:17

## 2025-07-25 RX ADMIN — METOCLOPRAMIDE 10 MG: 5 INJECTION, SOLUTION INTRAMUSCULAR; INTRAVENOUS at 07:44

## 2025-07-25 RX ADMIN — CEFAZOLIN SODIUM 2000 MG: 1 POWDER, FOR SOLUTION INTRAMUSCULAR; INTRAVENOUS at 11:54

## 2025-07-25 RX ADMIN — ROCURONIUM BROMIDE 50 MG: 10 INJECTION, SOLUTION INTRAVENOUS at 11:58

## 2025-07-25 RX ADMIN — ROCURONIUM BROMIDE 100 MG: 10 INJECTION, SOLUTION INTRAVENOUS at 07:36

## 2025-07-25 RX ADMIN — SODIUM CHLORIDE, SODIUM LACTATE, POTASSIUM CHLORIDE, AND CALCIUM CHLORIDE: .6; .31; .03; .02 INJECTION, SOLUTION INTRAVENOUS at 06:39

## 2025-07-25 RX ADMIN — HYDROMORPHONE HYDROCHLORIDE 1 MG: 1 INJECTION, SOLUTION INTRAMUSCULAR; INTRAVENOUS; SUBCUTANEOUS at 12:55

## 2025-07-25 RX ADMIN — ACETAMINOPHEN 650 MG: 325 TABLET ORAL at 20:05

## 2025-07-25 RX ADMIN — HYDROMORPHONE HYDROCHLORIDE 2 MG: 1 INJECTION, SOLUTION INTRAMUSCULAR; INTRAVENOUS; SUBCUTANEOUS at 07:35

## 2025-07-25 RX ADMIN — TRANEXAMIC ACID 1000 MG: 100 INJECTION, SOLUTION INTRAVENOUS at 07:49

## 2025-07-25 RX ADMIN — ROCURONIUM BROMIDE 50 MG: 10 INJECTION, SOLUTION INTRAVENOUS at 14:09

## 2025-07-25 RX ADMIN — HEPARIN SODIUM 5000 UNITS: 5000 INJECTION, SOLUTION INTRAVENOUS; SUBCUTANEOUS at 06:26

## 2025-07-25 RX ADMIN — HYDROMORPHONE HYDROCHLORIDE 1 MG: 1 INJECTION, SOLUTION INTRAMUSCULAR; INTRAVENOUS; SUBCUTANEOUS at 14:55

## 2025-07-25 RX ADMIN — METHOCARBAMOL 1000 MG: 100 INJECTION, SOLUTION INTRAMUSCULAR; INTRAVENOUS at 07:40

## 2025-07-25 ASSESSMENT — PAIN SCALES - GENERAL
PAINLEVEL_OUTOF10: 6
PAINLEVEL_OUTOF10: 6
PAINLEVEL_OUTOF10: 2

## 2025-07-25 ASSESSMENT — PAIN DESCRIPTION - ONSET: ONSET: GRADUAL

## 2025-07-25 ASSESSMENT — PAIN DESCRIPTION - DESCRIPTORS: DESCRIPTORS: ACHING;SORE

## 2025-07-25 ASSESSMENT — PAIN DESCRIPTION - FREQUENCY: FREQUENCY: CONTINUOUS

## 2025-07-25 ASSESSMENT — PAIN DESCRIPTION - LOCATION: LOCATION: ABDOMEN

## 2025-07-25 ASSESSMENT — PAIN DESCRIPTION - PAIN TYPE: TYPE: SURGICAL PAIN

## 2025-07-25 ASSESSMENT — PAIN DESCRIPTION - ORIENTATION: ORIENTATION: LOWER;MID

## 2025-07-25 ASSESSMENT — PAIN - FUNCTIONAL ASSESSMENT
PAIN_FUNCTIONAL_ASSESSMENT: PREVENTS OR INTERFERES WITH MANY ACTIVE NOT PASSIVE ACTIVITIES
PAIN_FUNCTIONAL_ASSESSMENT: 0-10

## 2025-07-25 NOTE — ANESTHESIA PRE PROCEDURE
Department of Anesthesiology  Preprocedure Note       Name:  Kimber Diaz   Age:  39 y.o.  :  1985                                          MRN:  [id number]         Date:  2025      Surgeon: Surgeon(s):  Gabriele Torrez MD    Procedure: Procedure(s):  EXPLANTATION OF RIGHT BREAST IMPLANT, RIGHT BREAST COMPLETE CAPSULECTOMY, RIGHT BREAST RECONSTRUCTION WITH ZOE INFERIOR EPIGASTRIC ARTERY  FLAP, POSSIBLE MUSCLE SPARING FREE TRANSVERSUS RECTUS ABDOMINIS MYOCUTANEOUS FLAP, POSSIBLE PEDICLED TRANSVERSUS RECTUS ABDOMINIS MYOCUTANEOUS FLAP, POSSIBLE ADJACENT TISSUE TRANSFER  .  .    Medications prior to admission:   Prior to Admission medications    Medication Sig Start Date End Date Taking? Authorizing Provider   anastrozole (ARIMIDEX) 1 MG tablet TAKE ONE TABLET BY MOUTH EVERY DAY WITH OR WITHOUT FOOD    ProviderRox MD   leuprolide (LUPRON) 3.75 MG injection Inject 7.5 mg into the muscle 10/9/23   ProviderRox MD       Current medications:    Current Facility-Administered Medications   Medication Dose Route Frequency Provider Last Rate Last Admin   • lactated ringers infusion   IntraVENous Continuous Cathy Hospon DO       • tranexamic acid (CYKLOKAPRON) 1,000 mg in sodium chloride 0.9 % 60 mL IVPB  1,000 mg IntraVENous Once Gabriele Torrez MD       • sodium chloride flush 0.9 % injection 5-40 mL  5-40 mL IntraVENous 2 times per day Gabriele Torrez MD       • sodium chloride flush 0.9 % injection 5-40 mL  5-40 mL IntraVENous PRN Gabriele Torrez MD       • 0.9 % sodium chloride infusion   IntraVENous PRN Gabriele Torrez MD       • lactated ringers infusion   IntraVENous Continuous Gabriele Torrez  mL/hr at 25 0639 New Bag at 25 0639   • ceFAZolin (ANCEF) 2,000 mg in sterile water 20 mL IV syringe  2,000 mg IntraVENous On Call to OR Gabriele Torrez MD           Allergies:  No Known Allergies    Problem List:    Patient Active Problem List

## 2025-07-25 NOTE — ANESTHESIA POSTPROCEDURE EVALUATION
Department of Anesthesiology  Postprocedure Note    Patient: Kimber Diaz  MRN: [id number]  YOB: 1985  Date of evaluation: 7/25/2025    Procedure Summary       Date: 07/25/25 Room / Location: [address]    Anesthesia Start: 0730 Anesthesia Stop: 1542    Procedures:       EXPLANTATION OF RIGHT BREAST IMPLANT, RIGHT BREAST COMPLETE CAPSULECTOMY, RIGHT BREAST RECONSTRUCTION WITH DEEP INFERIOR EPIGASTRIC ARTERY  FLAP, INCARCERATED VENTRAL HERNIA REPAIR (Right: Breast)      . (Right: Breast)      . (Right: Abdomen) Diagnosis:       Malignant neoplasm of right breast (HCC)      (Malignant neoplasm of right breast (HCC) [C50.911])    Surgeons: Gabriele Torrez MD Responsible Provider: Cathy Hopson DO    Anesthesia Type: general ASA Status: 2            Anesthesia Type: No value filed.    Merlyn Phase I: Merlyn Score: 10    Merlyn Phase II:      Anesthesia Post Evaluation    Patient location during evaluation: bedside  Patient participation: complete - patient participated  Level of consciousness: awake and alert  Airway patency: patent  Nausea & Vomiting: no nausea and no vomiting  Cardiovascular status: blood pressure returned to baseline  Respiratory status: nonlabored ventilation  Hydration status: euvolemic  Pain management: adequate    No notable events documented.

## 2025-07-25 NOTE — BRIEF OP NOTE
Drains:   Closed/Suction Drain Lateral Breast Bulb (Active)   Site Description Clean, dry & intact 07/25/25 1441   Dressing Status New dressing applied 07/25/25 1441   Drain Status To bulb suction 07/25/25 1441       Closed/Suction Drain Left;Anterior Hip Bulb (Active)   Site Description Clean, dry & intact 07/25/25 1451   Dressing Status New dressing applied 07/25/25 1451   Drain Status To bulb suction 07/25/25 1451       Closed/Suction Drain Right;Anterior Hip Bulb (Active)   Site Description Clean, dry & intact 07/25/25 1451   Dressing Status New dressing applied 07/25/25 1451   Drain Status To bulb suction 07/25/25 1451       Urinary Catheter 07/25/25 Martinez-Temperature (Active)       [REMOVED] Negative Pressure Wound Therapy Breast Right (Removed)       [REMOVED] Negative Pressure Wound Therapy Breast Left;Lower (Removed)       Findings:  Present At Time Of Surgery (PATOS) (choose all levels that have infection present):  No infection present  Other Findings: See operative dictation    Electronically signed by Gabriele Torrez MD on 7/25/2025 at 3:16 PM

## 2025-07-25 NOTE — OP NOTE
[facility]     OPERATIVE DICTATION    NAME: Kimber Diaz   MRN: [id number]  DATE: 7/25/2025    AGE: 39 y.o.     PREOPERATIVE DIAGNOSIS:  Right breast cancer, status post implant based reconstruction with radiation     POSTOPERATIVE DIAGNOSIS:  Same, with umbilical hernia     PROCEDURES:  1.  Explantation of right breast implant.  2.  Right breast complete capsulectomy.  3.  Right breast reconstruction with ZOE flap.  4.  Repair of incarcerated, ventral hernia (<3 cm)  5.  Placement of Phasix abdominal mesh.  6.  Intraoperative SPY fluorescent angiography.    SURGEON:  Gabriele Torrez MD     ASSISTANTS:  Zora Palacios ()     ANESTHESIA:  General.     ESTIMATED BLOOD LOSS:  75  mL.     DRAINS:  Three (one 15-Algerian round in the right breast, two 15-Algerian round in the abdomen).     SPECIMENS:  Implant, capsule, mastectomy skin,      OPERATIVE INDICATIONS:  This is a 39 y.o. female with a history of right breast cancer.  She underwent mastectomy with immediate bilateral direct to implant reconstruction and did well. She had an uneventful recovery although required adjuvant radiation and developed painful capsular contracture on the right. Discussion was then taken regarding her options and she desired to have an abdominally based autologous reconstruction for the radiated right breast. The risks, benefits, alternatives, outcomes, and personnel involved with the aforementioned procedure were discussed in detail with the patient.  After all questions were answered in a satisfactory manner, she agreed to proceed.     OPERATIVE PROCEDURE:  The patient was marked in the preoperative holding area, then brought to the operating room, placed in supine position on the operating table.  After satisfactory induction of general endotracheal anesthesia, the patient was then prepped and draped in the usual sterile manner.  A timeout was performed confirming the patient and the procedure to be

## 2025-07-25 NOTE — PROGRESS NOTES
Pt admitted to ICU from OR with OR team and Dr. Torrez at bedside. Pt is lethargic but arousable. Pt has 3 DYLAN drains in place; abdominal binder, and R breast flap monitor. Pulses confirmed to R breast by Dr. Torrez with doppler. Flap monitor 60%. Instructed by MD to not adjust head of bed at all from position on admit. Will monitor.

## 2025-07-26 LAB
ANION GAP SERPL CALCULATED.3IONS-SCNC: 10 MMOL/L (ref 3–16)
BUN SERPL-MCNC: 6 MG/DL (ref 7–20)
CALCIUM SERPL-MCNC: 9.1 MG/DL (ref 8.3–10.6)
CHLORIDE SERPL-SCNC: 100 MMOL/L (ref 99–110)
CO2 SERPL-SCNC: 29 MMOL/L (ref 21–32)
CREAT SERPL-MCNC: 0.6 MG/DL (ref 0.6–1.1)
DEPRECATED RDW RBC AUTO: 14.4 % (ref 12.4–15.4)
GFR SERPLBLD CREATININE-BSD FMLA CKD-EPI: >90 ML/MIN/{1.73_M2}
GLUCOSE SERPL-MCNC: 101 MG/DL (ref 70–99)
HCT VFR BLD AUTO: 29.2 % (ref 36–48)
HGB BLD-MCNC: 10.1 G/DL (ref 12–16)
MCH RBC QN AUTO: 32.3 PG (ref 26–34)
MCHC RBC AUTO-ENTMCNC: 34.6 G/DL (ref 31–36)
MCV RBC AUTO: 93.4 FL (ref 80–100)
PLATELET # BLD AUTO: 210 K/UL (ref 135–450)
PMV BLD AUTO: 6.4 FL (ref 5–10.5)
POTASSIUM SERPL-SCNC: 4.4 MMOL/L (ref 3.5–5.1)
RBC # BLD AUTO: 3.12 M/UL (ref 4–5.2)
SODIUM SERPL-SCNC: 139 MMOL/L (ref 136–145)
WBC # BLD AUTO: 12.2 K/UL (ref 4–11)

## 2025-07-26 PROCEDURE — 6370000000 HC RX 637 (ALT 250 FOR IP)

## 2025-07-26 PROCEDURE — 6360000002 HC RX W HCPCS: Performed by: SURGERY

## 2025-07-26 PROCEDURE — 85027 COMPLETE CBC AUTOMATED: CPT

## 2025-07-26 PROCEDURE — 36415 COLL VENOUS BLD VENIPUNCTURE: CPT

## 2025-07-26 PROCEDURE — 99024 POSTOP FOLLOW-UP VISIT: CPT | Performed by: SURGERY

## 2025-07-26 PROCEDURE — 80048 BASIC METABOLIC PNL TOTAL CA: CPT

## 2025-07-26 PROCEDURE — 2000000000 HC ICU R&B

## 2025-07-26 PROCEDURE — 2500000003 HC RX 250 WO HCPCS: Performed by: SURGERY

## 2025-07-26 RX ORDER — GINSENG 100 MG
CAPSULE ORAL 3 TIMES DAILY
Status: DISCONTINUED | OUTPATIENT
Start: 2025-07-26 | End: 2025-07-28 | Stop reason: HOSPADM

## 2025-07-26 RX ADMIN — SODIUM CHLORIDE, PRESERVATIVE FREE 10 ML: 5 INJECTION INTRAVENOUS at 10:48

## 2025-07-26 RX ADMIN — BACITRACIN: 500 OINTMENT TOPICAL at 21:07

## 2025-07-26 RX ADMIN — BACITRACIN: 500 OINTMENT TOPICAL at 10:49

## 2025-07-26 RX ADMIN — SODIUM CHLORIDE, PRESERVATIVE FREE 10 ML: 5 INJECTION INTRAVENOUS at 21:06

## 2025-07-26 RX ADMIN — HEPARIN SODIUM 5000 UNITS: 5000 INJECTION INTRAVENOUS; SUBCUTANEOUS at 21:18

## 2025-07-26 RX ADMIN — HEPARIN SODIUM 5000 UNITS: 5000 INJECTION INTRAVENOUS; SUBCUTANEOUS at 09:17

## 2025-07-26 ASSESSMENT — PAIN SCALES - GENERAL
PAINLEVEL_OUTOF10: 4
PAINLEVEL_OUTOF10: 0
PAINLEVEL_OUTOF10: 3
PAINLEVEL_OUTOF10: 3

## 2025-07-26 ASSESSMENT — PAIN DESCRIPTION - LOCATION: LOCATION: ABDOMEN

## 2025-07-26 ASSESSMENT — PAIN DESCRIPTION - DESCRIPTORS: DESCRIPTORS: ACHING;SORE

## 2025-07-26 NOTE — PROGRESS NOTES
Department of Surgery: Progress Note    CC: Right breast cancer, s/p implant based reconstruction with radiation s/p ZOE flap reconstruction 7/25    Subjective:   Pain is controlled, denies nausea or vomiting. Doing well and in good spirits    Objective:  Vitals:    07/26/25 0500 07/26/25 0600 07/26/25 0700 07/26/25 0800   BP: 99/63 101/65 (!) 92/55 100/67   Pulse: 56 63 66 64   Resp: 18 23 12 24   Temp:    97.7 °F (36.5 °C)   TempSrc:    Oral   SpO2: 97% 98%  96%   Weight:  63.1 kg (139 lb 1.8 oz)     Height:         General appearance: alert, no acute distress, grooming appropriate  Chest/Lungs:  normal effort, symmetric chest rise   Breast: R breast DYLAN drain with serosanguinous output, Good doppler signal at lateral sutures z2. Good cap refill, vioptics in place with 61% on monitor, no signs of hematoma or swelling  Cardiovascular: RRR  Abdomen: soft, appropriately tender, non-distended, incisions c/d/I, DYLAN drain x2 with SS output  : quan in place with clear yellow urine  Skin: no erythema or rashes, no cyanosis  Extremities: no edema, no cyanosis  Neuro: A&Ox3, no focal deficits, sensation intact    Assessment and Plan  This is a 39 y.o. year old female s/p ZOE flap R breast reconstruction POD #0    Right breast cancer, status post implant based reconstruction with radiation s/p ZOE flap R breast reconstruction POD #1  - q1hr flap checks  - Alert residents for consistent drop in vioptics percentage, currently 61%  - OOB this AM, ambulate with assistance  - Monitor DYLAN drain output    Neuro:   Pain/sedation/psych  PRN: Tylenol, Oxy, Morphine    Cardiovascular:   Hemodynamically stable no acute issues.     Pulmonary:   No acute issues  PRN DuoNebs as needed    GI:   Monitor DYLAN drain output to abdominal incisions  Abdominal binder with foam to remain in place    FEN:   LR 100cc/hr  Monitor strict I&Os  Diet: NPO with ice chips    /Renal:   Quan to remain in place for adequate measurement of UOPR    Hem/ID:

## 2025-07-26 NOTE — PROGRESS NOTES
4 Eyes Skin Assessment     The patient is being assess for   Post-Op Surgical    I agree that 2 RN's have performed a thorough Head to Toe Skin Assessment on the patient. ALL assessment sites listed below have been assessed.      Areas assessed by both nurses:   [x]   Head, Face, and Ears   [x]   Shoulders, Back, and Chest, Abdomen  [x]   Arms, Elbows, and Hands   [x]   Coccyx, Sacrum, and Ischium  [x]   Legs, Feet, and Heels        Skin is WNL with no breakdown noted .     **SHARE this note so that the co-signing nurse is able to place an eSignature**    Co-signer eSignature: {Esignature:[id number]}    Does the Patient have Skin Breakdown?  {Blank single:13234::\"No\",\"Yes LDA WOUND CARE was Initiated documentation include the Erin-wound, Wound Assessment, Measurements, Dressing Treatment, Drainage, and Color\"  , \"}          Channing Prevention initiated:  {YES/NO:19732}   Wound Care Orders initiated:  {YES/NO:19732}      WO nurse consulted for Pressure Injury (Stage 3,4, Unstageable, DTI, NWPT, Complex wounds)and New or Established Ostomies:  {YES/NO:19732}      Primary Nurse eSignature: {Esignature:[id number]}

## 2025-07-26 NOTE — PROGRESS NOTES
No acute events overnight.   Pain well controlled with current medications. Abdominal incision remains C/D/I. Abdominal binder and foam in place. Pulses in breast found easily with doppler. Tissue oximetry readings consistently in 60% range, with lowest reading of 59%. Head of bed remains at 30 degrees, per order. Quan catheter in place, quan care completed this AM.   Patient currently resting in bed. Awakens easily to voice. Denies any additional needs at time of writing.  Electronically signed by Suki Ott RN on 7/26/25 at 6:25 AM EDT

## 2025-07-26 NOTE — PROGRESS NOTES
Department of Surgery:  Post-op Note    CC: Right breast cancer, status post implant based reconstruction with radiation     Procedure(s) Performed:   1.  Explantation of right breast implant.  2.  Right breast complete capsulectomy.  3.  Right breast reconstruction with ZOE flap.  4.  Repair of incarcerated, ventral hernia (<3 cm)  5.  Placement of Phasix abdominal mesh.  6.  Intraoperative SPY fluorescent angiography.    Subjective:   Pain is controlled, denies nausea or vomiting. Doing well and in good spirits    Objective:  Anesthesia type: General      I/O    Intra op    Post op     Fluids  5000 mL + 250ml 5% albumin 400 mL     EBL 75 mL 0 mL     Urine 1945 mL 375 mL     Exam:  Vitals:    07/25/25 1930 07/25/25 1945 07/25/25 2000 07/25/25 2005   BP: 120/79 117/81 118/79    Pulse: 77 52 57    Resp: 20 14 25 14   Temp:   97.6 °F (36.4 °C)    TempSrc:   Oral    SpO2: 100% 92% 97%    Weight:       Height:           General appearance: alert, no acute distress, grooming appropriate  Chest/Lungs:  normal effort, symmetric chest rise   Breast: R breast DYLAN drain with serosanguinous output, Good doppler signal at lateral sutures z2. Good cap refill, vioptics in place with 61% on monitor, no signs of hematoma or swelling  Cardiovascular: RRR  Abdomen: soft, appropriately tender, non-distended, incisions c/d/I, DYLAN drain x2 with SS output  : quan in place with clear yellow urine  Skin: no erythema or rashes, no cyanosis  Extremities: no edema, no cyanosis  Neuro: A&Ox3, no focal deficits, sensation intact    Assessment and Plan  This is a 39 y.o. year old female s/p ZOE flap R breast reconstruction POD #0    Right breast cancer, status post implant based reconstruction with radiation s/p ZOE flap R breast reconstruction POD #0  - q1hr flap checks  - Alert residents for consistent drop in vioptics percentage, currently 61%  - Bedrest tonight  - Monitor DYLAN drain output    Neuro:   Pain/sedation/psych  Tylenol pen, Oxy

## 2025-07-26 NOTE — PROGRESS NOTES
Plastic Surgery Progress Note    Removed abdominal binder and foam pad. Took down Tegaderm dressing and applied Bacitracin to umbilicus, covered with gauze. Replaced foam pad and abdominal binder. Will reapply Bacitracin to umbilicus tomorrow.         Debora Ortiz MD  PGY1, General Surgery  07/26/25  4:31 PM  [employer]  Pager Number: [phone]

## 2025-07-26 NOTE — PROGRESS NOTES
[facility]LASTICS    Doing very well.  Pain well controlled.  Flap viable with excellent doppler signals. Vioptix strong over 60% with 90% signal qualtiy.    Plan for OOB this AM, DC quan, regular diet, and ambulate with assistance.  Will need to ensure that the Vioptix remains plugged in during OOB sessions.    Ariel Torrez MD  [facility] Plastic & Reconstructive Surgery  [phone]  07/26/25

## 2025-07-27 LAB
ALBUMIN SERPL-MCNC: 4.1 G/DL (ref 3.4–5)
ANION GAP SERPL CALCULATED.3IONS-SCNC: 12 MMOL/L (ref 3–16)
BASOPHILS # BLD: 0 K/UL (ref 0–0.2)
BASOPHILS NFR BLD: 0.2 %
BUN SERPL-MCNC: 7 MG/DL (ref 7–20)
CALCIUM SERPL-MCNC: 9.3 MG/DL (ref 8.3–10.6)
CHLORIDE SERPL-SCNC: 98 MMOL/L (ref 99–110)
CO2 SERPL-SCNC: 27 MMOL/L (ref 21–32)
CREAT SERPL-MCNC: 0.6 MG/DL (ref 0.6–1.1)
DEPRECATED RDW RBC AUTO: 13.9 % (ref 12.4–15.4)
EOSINOPHIL # BLD: 0 K/UL (ref 0–0.6)
EOSINOPHIL NFR BLD: 0.2 %
GFR SERPLBLD CREATININE-BSD FMLA CKD-EPI: >90 ML/MIN/{1.73_M2}
GLUCOSE SERPL-MCNC: 101 MG/DL (ref 70–99)
HCT VFR BLD AUTO: 31.6 % (ref 36–48)
HGB BLD-MCNC: 11.2 G/DL (ref 12–16)
LYMPHOCYTES # BLD: 0.7 K/UL (ref 1–5.1)
LYMPHOCYTES NFR BLD: 6.8 %
MAGNESIUM SERPL-MCNC: 1.75 MG/DL (ref 1.8–2.4)
MCH RBC QN AUTO: 33 PG (ref 26–34)
MCHC RBC AUTO-ENTMCNC: 35.5 G/DL (ref 31–36)
MCV RBC AUTO: 92.9 FL (ref 80–100)
MONOCYTES # BLD: 0.7 K/UL (ref 0–1.3)
MONOCYTES NFR BLD: 6.5 %
NEUTROPHILS # BLD: 8.9 K/UL (ref 1.7–7.7)
NEUTROPHILS NFR BLD: 86.3 %
PHOSPHATE SERPL-MCNC: 2 MG/DL (ref 2.5–4.9)
PLATELET # BLD AUTO: 219 K/UL (ref 135–450)
PMV BLD AUTO: 6.5 FL (ref 5–10.5)
POTASSIUM SERPL-SCNC: 3.8 MMOL/L (ref 3.5–5.1)
RBC # BLD AUTO: 3.4 M/UL (ref 4–5.2)
SODIUM SERPL-SCNC: 137 MMOL/L (ref 136–145)
WBC # BLD AUTO: 10.3 K/UL (ref 4–11)

## 2025-07-27 PROCEDURE — 83735 ASSAY OF MAGNESIUM: CPT

## 2025-07-27 PROCEDURE — 6360000002 HC RX W HCPCS

## 2025-07-27 PROCEDURE — 80069 RENAL FUNCTION PANEL: CPT

## 2025-07-27 PROCEDURE — 99024 POSTOP FOLLOW-UP VISIT: CPT | Performed by: SURGERY

## 2025-07-27 PROCEDURE — 85025 COMPLETE CBC W/AUTO DIFF WBC: CPT

## 2025-07-27 PROCEDURE — 2500000003 HC RX 250 WO HCPCS: Performed by: SURGERY

## 2025-07-27 PROCEDURE — 36415 COLL VENOUS BLD VENIPUNCTURE: CPT

## 2025-07-27 PROCEDURE — 2000000000 HC ICU R&B

## 2025-07-27 PROCEDURE — 6360000002 HC RX W HCPCS: Performed by: SURGERY

## 2025-07-27 PROCEDURE — 6370000000 HC RX 637 (ALT 250 FOR IP)

## 2025-07-27 RX ORDER — MAGNESIUM SULFATE IN WATER 40 MG/ML
2000 INJECTION, SOLUTION INTRAVENOUS ONCE
Status: COMPLETED | OUTPATIENT
Start: 2025-07-27 | End: 2025-07-27

## 2025-07-27 RX ADMIN — SODIUM CHLORIDE, PRESERVATIVE FREE 10 ML: 5 INJECTION INTRAVENOUS at 07:34

## 2025-07-27 RX ADMIN — HEPARIN SODIUM 5000 UNITS: 5000 INJECTION INTRAVENOUS; SUBCUTANEOUS at 21:00

## 2025-07-27 RX ADMIN — BACITRACIN: 500 OINTMENT TOPICAL at 20:53

## 2025-07-27 RX ADMIN — SODIUM CHLORIDE, PRESERVATIVE FREE 10 ML: 5 INJECTION INTRAVENOUS at 20:53

## 2025-07-27 RX ADMIN — BACITRACIN: 500 OINTMENT TOPICAL at 15:17

## 2025-07-27 RX ADMIN — Medication 1 TABLET: at 20:53

## 2025-07-27 RX ADMIN — HEPARIN SODIUM 5000 UNITS: 5000 INJECTION INTRAVENOUS; SUBCUTANEOUS at 05:44

## 2025-07-27 RX ADMIN — HEPARIN SODIUM 5000 UNITS: 5000 INJECTION INTRAVENOUS; SUBCUTANEOUS at 15:16

## 2025-07-27 RX ADMIN — Medication 1 TABLET: at 09:00

## 2025-07-27 RX ADMIN — MAGNESIUM SULFATE HEPTAHYDRATE 2000 MG: 40 INJECTION, SOLUTION INTRAVENOUS at 09:06

## 2025-07-27 RX ADMIN — BACITRACIN: 500 OINTMENT TOPICAL at 07:34

## 2025-07-27 ASSESSMENT — PAIN SCALES - GENERAL
PAINLEVEL_OUTOF10: 0
PAINLEVEL_OUTOF10: 0
PAINLEVEL_OUTOF10: 3
PAINLEVEL_OUTOF10: 2
PAINLEVEL_OUTOF10: 0

## 2025-07-27 ASSESSMENT — PAIN DESCRIPTION - DESCRIPTORS: DESCRIPTORS: ACHING;SORE

## 2025-07-27 ASSESSMENT — PAIN DESCRIPTION - PAIN TYPE: TYPE: SURGICAL PAIN

## 2025-07-27 ASSESSMENT — PAIN DESCRIPTION - LOCATION: LOCATION: ABDOMEN

## 2025-07-27 NOTE — PROGRESS NOTES
Vioptex reads 55% reported to Dr Ortiz via [employer]. Pulse signals present, right breast warm w/ no numbness <3  sec cap refill. Order received to check at 0300 then continue with q 2 hr checks.

## 2025-07-27 NOTE — PROGRESS NOTES
[facility]LASTICS    Doing well.  Flap with excellent perfusion - between 50-65% with signal quality in the mid-high 90s.  Drains serosang.    Will observe today with q4h checks.  If she continues to look good will re-evaluate later today and potentially plan for DC either tonight or tomorrow.    Ariel Torrez MD  [facility] Plastic & Reconstructive Surgery  [phone]  07/27/25

## 2025-07-27 NOTE — PROGRESS NOTES
Department of Surgery: Progress Note    CC: Right breast cancer, s/p implant based reconstruction with radiation s/p ZOE flap reconstruction 7/25    Subjective:   No acute events overnight. Pain is well controlled. Tolerating diet. Voiding and ambulating well.    Objective:  Vitals:    07/27/25 0400 07/27/25 0500 07/27/25 0531 07/27/25 0614   BP: 113/66 114/69  (!) 99/56   Pulse: 84 77  81   Resp: 24 18  22   Temp: 99.6 °F (37.6 °C)      TempSrc: Oral      SpO2: 95% 95%  93%   Weight:   62.2 kg (137 lb 2 oz)    Height:         General appearance: alert, no acute distress, grooming appropriate  Chest/Lungs:  normal effort, symmetric chest rise   Breast: R breast DYLAN drain with serosanguinous output, Good doppler signal at lateral sutures z2. Good cap refill, vioptics in place with 56% on monitor, no signs of hematoma or swelling  Cardiovascular: RRR  Abdomen: soft, appropriately tender, non-distended, incisions c/d/I, DYLAN drain x2 with SS output. Overlying RESTON foam and abd binder present  Skin: no erythema or rashes, no cyanosis  Extremities: no edema, no cyanosis  Neuro: A&Ox3, no focal deficits, sensation intact    Assessment and Plan  This is a 39 y.o. year old female s/p ZOE flap R breast reconstruction POD #0    Right breast cancer, status post implant based reconstruction with radiation s/p ZOE flap R breast reconstruction POD #1  - q4hr flap checks  - Alert residents for consistent drop in vioptics percentage, currently 56%  - OOB this AM, ambulate with assistance  - Monitor DYLAN drain output    Neuro:   Pain/sedation/psych  PRN: Tylenol, Oxy, Morphine    Cardiovascular:   Hemodynamically stable no acute issues.     Pulmonary:   No acute issues  PRN DuoNebs as needed    GI:   Monitor DYLAN drain output to abdominal incisions  Abdominal binder with foam to remain in place    FEN:   LR 100cc/hr  Monitor strict I&Os  Diet: NPO with ice chips    /Renal:   Martinez removed, voiding independently     Hem/ID:   Follow

## 2025-07-27 NOTE — DISCHARGE INSTRUCTIONS
seek immediate medical care if:    You have signs of infection, such as:  Increased pain, swelling, warmth, or redness around the area.  Red streaks leading from the area.  Pus draining from the area.  A fever.     You see a sudden change in the color or smell of the drainage.     The tube is coming loose where it leaves your skin.    Watch closely for changes in your health, and be sure to contact your doctor if:    You see a lot of fluid around the drain.     You cannot remove a clot from the tube by milking the tube.     Where can you learn more?  Go to [email or web address] and sign in to your [employer] account. Enter K117 in the Search Health Information box to learn more about \"Surgical Drain Care: Care Instructions.\"     If you do not have an account, please click on the \"Sign Up Now\" link.  Current as of: June 26, 2019Content Version: 12.4  © 0978-7563 [employer].  Care instructions adapted under license by [employer]. If you have questions about a medical condition or this instruction, always ask your healthcare professional. [employer] disclaims any warranty or liability for your use of this information.

## 2025-07-28 VITALS
SYSTOLIC BLOOD PRESSURE: 109 MMHG | OXYGEN SATURATION: 97 % | TEMPERATURE: 98.7 F | WEIGHT: 136.24 LBS | HEIGHT: 64 IN | RESPIRATION RATE: 23 BRPM | BODY MASS INDEX: 23.26 KG/M2 | DIASTOLIC BLOOD PRESSURE: 73 MMHG | HEART RATE: 94 BPM

## 2025-07-28 LAB
ALBUMIN SERPL-MCNC: 3.7 G/DL (ref 3.4–5)
ANION GAP SERPL CALCULATED.3IONS-SCNC: 11 MMOL/L (ref 3–16)
BASOPHILS # BLD: 0 K/UL (ref 0–0.2)
BASOPHILS NFR BLD: 0.3 %
BUN SERPL-MCNC: 8 MG/DL (ref 7–20)
CALCIUM SERPL-MCNC: 8.6 MG/DL (ref 8.3–10.6)
CHLORIDE SERPL-SCNC: 100 MMOL/L (ref 99–110)
CO2 SERPL-SCNC: 26 MMOL/L (ref 21–32)
CREAT SERPL-MCNC: 0.5 MG/DL (ref 0.6–1.1)
DEPRECATED RDW RBC AUTO: 14 % (ref 12.4–15.4)
EOSINOPHIL # BLD: 0.1 K/UL (ref 0–0.6)
EOSINOPHIL NFR BLD: 0.7 %
GFR SERPLBLD CREATININE-BSD FMLA CKD-EPI: >90 ML/MIN/{1.73_M2}
GLUCOSE SERPL-MCNC: 93 MG/DL (ref 70–99)
HCT VFR BLD AUTO: 30.9 % (ref 36–48)
HGB BLD-MCNC: 10.8 G/DL (ref 12–16)
LYMPHOCYTES # BLD: 0.9 K/UL (ref 1–5.1)
LYMPHOCYTES NFR BLD: 10.8 %
MAGNESIUM SERPL-MCNC: 2.01 MG/DL (ref 1.8–2.4)
MCH RBC QN AUTO: 32.3 PG (ref 26–34)
MCHC RBC AUTO-ENTMCNC: 34.8 G/DL (ref 31–36)
MCV RBC AUTO: 92.8 FL (ref 80–100)
MONOCYTES # BLD: 0.5 K/UL (ref 0–1.3)
MONOCYTES NFR BLD: 5.9 %
NEUTROPHILS # BLD: 7 K/UL (ref 1.7–7.7)
NEUTROPHILS NFR BLD: 82.3 %
PHOSPHATE SERPL-MCNC: 2.1 MG/DL (ref 2.5–4.9)
PLATELET # BLD AUTO: 229 K/UL (ref 135–450)
PMV BLD AUTO: 6.5 FL (ref 5–10.5)
POTASSIUM SERPL-SCNC: 3.4 MMOL/L (ref 3.5–5.1)
RBC # BLD AUTO: 3.33 M/UL (ref 4–5.2)
SODIUM SERPL-SCNC: 137 MMOL/L (ref 136–145)
WBC # BLD AUTO: 8.5 K/UL (ref 4–11)

## 2025-07-28 PROCEDURE — 36415 COLL VENOUS BLD VENIPUNCTURE: CPT

## 2025-07-28 PROCEDURE — 6360000002 HC RX W HCPCS: Performed by: SURGERY

## 2025-07-28 PROCEDURE — 83735 ASSAY OF MAGNESIUM: CPT

## 2025-07-28 PROCEDURE — 6370000000 HC RX 637 (ALT 250 FOR IP)

## 2025-07-28 PROCEDURE — 2500000003 HC RX 250 WO HCPCS

## 2025-07-28 PROCEDURE — 85025 COMPLETE CBC W/AUTO DIFF WBC: CPT

## 2025-07-28 PROCEDURE — 80069 RENAL FUNCTION PANEL: CPT

## 2025-07-28 PROCEDURE — 2580000003 HC RX 258

## 2025-07-28 RX ORDER — CEPHALEXIN 500 MG/1
500 CAPSULE ORAL 4 TIMES DAILY
Qty: 40 CAPSULE | Refills: 0 | OUTPATIENT
Start: 2025-07-28 | End: 2025-08-07

## 2025-07-28 RX ORDER — DOCUSATE SODIUM 100 MG/1
100 CAPSULE, LIQUID FILLED ORAL 2 TIMES DAILY PRN
Qty: 10 CAPSULE | Refills: 0 | Status: SHIPPED | OUTPATIENT
Start: 2025-07-28 | End: 2025-08-04

## 2025-07-28 RX ORDER — OXYCODONE HYDROCHLORIDE 5 MG/1
5 TABLET ORAL EVERY 6 HOURS PRN
Qty: 28 TABLET | Refills: 0 | Status: SHIPPED | OUTPATIENT
Start: 2025-07-28 | End: 2025-08-04

## 2025-07-28 RX ORDER — METHOCARBAMOL 750 MG/1
750 TABLET, FILM COATED ORAL 3 TIMES DAILY PRN
Qty: 21 TABLET | Refills: 0 | Status: SHIPPED | OUTPATIENT
Start: 2025-07-28 | End: 2025-08-04

## 2025-07-28 RX ORDER — CEPHALEXIN 500 MG/1
500 CAPSULE ORAL 4 TIMES DAILY
Qty: 40 CAPSULE | Refills: 0 | Status: SHIPPED | OUTPATIENT
Start: 2025-07-28 | End: 2025-08-07

## 2025-07-28 RX ADMIN — POTASSIUM PHOSPHATE, MONOBASIC AND POTASSIUM PHOSPHATE, DIBASIC 30 MMOL: 224; 236 INJECTION, SOLUTION, CONCENTRATE INTRAVENOUS at 10:11

## 2025-07-28 RX ADMIN — BACITRACIN: 500 OINTMENT TOPICAL at 10:14

## 2025-07-28 RX ADMIN — Medication 1 TABLET: at 10:10

## 2025-07-28 RX ADMIN — HEPARIN SODIUM 5000 UNITS: 5000 INJECTION INTRAVENOUS; SUBCUTANEOUS at 05:40

## 2025-07-28 RX ADMIN — BACITRACIN: 500 OINTMENT TOPICAL at 15:05

## 2025-07-28 ASSESSMENT — PAIN SCALES - GENERAL
PAINLEVEL_OUTOF10: 0
PAINLEVEL_OUTOF10: 0

## 2025-07-28 NOTE — PLAN OF CARE
Problem: Discharge Planning  Goal: Discharge to home or other facility with appropriate resources  7/28/2025 1433 by Zohra Pascal RN  Outcome: Adequate for Discharge     Problem: Pain  Goal: Verbalizes/displays adequate comfort level or baseline comfort level  7/28/2025 1433 by Zohra Pascal RN  Outcome: Adequate for Discharge     Problem: ABCDS Injury Assessment  Goal: Absence of physical injury  7/28/2025 1433 by Zohra Pascal RN  Outcome: Adequate for Discharge     Problem: Respiratory - Adult  Goal: Achieves optimal ventilation and oxygenation  7/28/2025 1433 by Zohra Pascal RN  Outcome: Adequate for Discharge     Problem: Skin/Tissue Integrity - Adult  Goal: Incisions, wounds, or drain sites healing without S/S of infection  7/28/2025 1433 by Zohra Pascal RN  Outcome: Adequate for Discharge     Problem: Musculoskeletal - Adult  Goal: Return mobility to safest level of function  7/28/2025 1433 by Zohra Pascal RN  Outcome: Adequate for Discharge     Problem: Gastrointestinal - Adult  Goal: Maintains or returns to baseline bowel function  7/28/2025 1433 by Zohra Pascal RN  Outcome: Adequate for Discharge     Problem: Genitourinary - Adult  Goal: Urinary catheter remains patent  Recent Flowsheet Documentation  Taken 7/28/2025 0800 by Zohra Pascal RN  Urinary catheter remains patent:   Assess patency of urinary catheter   Irrigate catheter per Licensed Independent Practitioner order if indicated and notify Licensed Independent Practitioner if unable to irrigate   Assess need for a larger catheter size or a 3-way catheter for continuous bladder irrigation     Problem: Infection - Adult  Goal: Absence of infection at discharge  7/28/2025 1433 by Zohra Pascal RN  Outcome: Adequate for Discharge     Problem: Infection - Adult  Goal: Absence of infection during hospitalization  7/28/2025 1433 by Zohra Pascal RN  Outcome: Adequate 
  Problem: Discharge Planning  Goal: Discharge to home or other facility with appropriate resources  Outcome: Progressing     Problem: Pain  Goal: Verbalizes/displays adequate comfort level or baseline comfort level  Outcome: Progressing     Problem: ABCDS Injury Assessment  Goal: Absence of physical injury  Outcome: Progressing     Problem: Respiratory - Adult  Goal: Achieves optimal ventilation and oxygenation  Outcome: Progressing     Problem: Skin/Tissue Integrity - Adult  Goal: Incisions, wounds, or drain sites healing without S/S of infection  Outcome: Progressing     Problem: Musculoskeletal - Adult  Goal: Return mobility to safest level of function  Outcome: Progressing  Goal: Return ADL status to a safe level of function  Outcome: Progressing     Problem: Gastrointestinal - Adult  Goal: Maintains or returns to baseline bowel function  Outcome: Progressing     Problem: Genitourinary - Adult  Goal: Urinary catheter remains patent  Outcome: Progressing     
  Problem: Discharge Planning  Goal: Discharge to home or other facility with appropriate resources  Outcome: Progressing  Flowsheets (Taken 7/26/2025 1237)  Discharge to home or other facility with appropriate resources:   Identify barriers to discharge with patient and caregiver   Identify discharge learning needs (meds, wound care, etc)     Problem: Pain  Goal: Verbalizes/displays adequate comfort level or baseline comfort level  Flowsheets (Taken 7/26/2025 1237)  Verbalizes/displays adequate comfort level or baseline comfort level:   Encourage patient to monitor pain and request assistance   Assess pain using appropriate pain scale   Administer analgesics based on type and severity of pain and evaluate response   Implement non-pharmacological measures as appropriate and evaluate response     Problem: Skin/Tissue Integrity - Adult  Goal: Incisions, wounds, or drain sites healing without S/S of infection  Outcome: Progressing  Flowsheets (Taken 7/26/2025 1237)  Incisions, Wounds, or Drain Sites Healing Without Sign and Symptoms of Infection:   ADMISSION and DAILY: Assess and document risk factors for pressure ulcer development   Implement wound care per orders     Problem: Musculoskeletal - Adult  Goal: Return mobility to safest level of function  Outcome: Progressing  Flowsheets (Taken 7/26/2025 1237)  Return Mobility to Safest Level of Function:   Assess patient stability and activity tolerance for standing, transferring and ambulating with or without assistive devices   Assist with transfers and ambulation using safe patient handling equipment as needed   Ensure adequate protection for wounds/incisions during mobilization   Instruct patient/family in ordered activity level  Goal: Return ADL status to a safe level of function  Outcome: Progressing  Flowsheets (Taken 7/26/2025 1237)  Return ADL Status to a Safe Level of Function:   Assess activities of daily living deficits and provide assistive devices as 
  Problem: Pain  Goal: Verbalizes/displays adequate comfort level or baseline comfort level  7/27/2025 2213 by Sonia Arias RN  Outcome: Progressing  Flowsheets (Taken 7/27/2025 2000)  Verbalizes/displays adequate comfort level or baseline comfort level: Encourage patient to monitor pain and request assistance     Problem: ABCDS Injury Assessment  Goal: Absence of physical injury  7/27/2025 2213 by Sonia Arias RN  Outcome: Progressing  Flowsheets (Taken 7/27/2025 2000)  Absence of Physical Injury: Implement safety measures based on patient assessment     Problem: Skin/Tissue Integrity - Adult  Goal: Incisions, wounds, or drain sites healing without S/S of infection  7/27/2025 2213 by Sonia Arias RN  Outcome: Progressing  Flowsheets (Taken 7/27/2025 2000)  Incisions, Wounds, or Drain Sites Healing Without Sign and Symptoms of Infection: TWICE DAILY: Assess and document skin integrity     Problem: Musculoskeletal - Adult  Goal: Return mobility to safest level of function  7/27/2025 2213 by Sonia Arias RN  Outcome: Progressing  Flowsheets (Taken 7/27/2025 2000)  Return Mobility to Safest Level of Function: Assess patient stability and activity tolerance for standing, transferring and ambulating with or without assistive devices     Problem: Musculoskeletal - Adult  Goal: Return ADL status to a safe level of function  7/27/2025 2213 by Sonia Arias RN  Outcome: Progressing  Flowsheets (Taken 7/27/2025 2000)  Return ADL Status to a Safe Level of Function: Assess activities of daily living deficits and provide assistive devices as needed     Problem: Gastrointestinal - Adult  Goal: Maintains or returns to baseline bowel function  Outcome: Progressing  Flowsheets (Taken 7/27/2025 2000)  Maintains or returns to baseline bowel function: Assess bowel function     Problem: Infection - Adult  Goal: Absence of infection at discharge  7/27/2025 2213 by Sonia Arias RN  Outcome: Progressing  Flowsheets (Taken 7/27/2025 
  Problem: Pain  Goal: Verbalizes/displays adequate comfort level or baseline comfort level  Outcome: Progressing  Flowsheets (Taken 7/26/2025 2000)  Verbalizes/displays adequate comfort level or baseline comfort level: Encourage patient to monitor pain and request assistance     Problem: ABCDS Injury Assessment  Goal: Absence of physical injury  Outcome: Progressing  Flowsheets (Taken 7/26/2025 2000)  Absence of Physical Injury: Implement safety measures based on patient assessment     Problem: Skin/Tissue Integrity - Adult  Goal: Incisions, wounds, or drain sites healing without S/S of infection  Outcome: Progressing  Flowsheets (Taken 7/26/2025 2000)  Incisions, Wounds, or Drain Sites Healing Without Sign and Symptoms of Infection: ADMISSION and DAILY: Assess and document risk factors for pressure ulcer development     Problem: Musculoskeletal - Adult  Goal: Return mobility to safest level of function  Outcome: Progressing  Flowsheets (Taken 7/26/2025 2000)  Return Mobility to Safest Level of Function: Assess patient stability and activity tolerance for standing, transferring and ambulating with or without assistive devices     Problem: Infection - Adult  Goal: Absence of infection at discharge  Outcome: Progressing  Goal: Absence of infection during hospitalization  Outcome: Progressing  Goal: Absence of fever/infection during anticipated neutropenic period  Outcome: Progressing     Problem: Safety - Adult  Goal: Free from fall injury  Outcome: Progressing  Flowsheets (Taken 7/26/2025 2000)  Free From Fall Injury: Instruct family/caregiver on patient safety     
by Yoppolo, Petrona, RN  Outcome: Progressing  Flowsheets (Taken 7/27/2025 0837)  Absence of fever/infection during anticipated neutropenic period: Monitor white blood cell count  7/27/2025 0515 by Sonia Arias RN  Outcome: Progressing     Problem: Safety - Adult  Goal: Free from fall injury  7/27/2025 0837 by Petrona Fuchs RN  Outcome: Progressing  Flowsheets (Taken 7/26/2025 2000 by Sonia Arias RN)  Free From Fall Injury: Instruct family/caregiver on patient safety  7/27/2025 0515 by Sonia Arias RN  Outcome: Progressing  Flowsheets (Taken 7/26/2025 2000)  Free From Fall Injury: Instruct family/caregiver on patient safety

## 2025-07-28 NOTE — PROGRESS NOTES
Pt given written and verbal discharge instructions. Pt indicated understanding of home medication and care instructions. Prescriptions provided to pt via [facility]. Pt's belongings packed. IV's removed, pt tolerated. Pt escorted down to private vehicle by this RN via wheelchair at this time. No needs voiced.

## 2025-07-29 ENCOUNTER — TELEPHONE (OUTPATIENT)
Dept: SURGERY | Age: 40
End: 2025-07-29

## 2025-07-29 NOTE — TELEPHONE ENCOUNTER
S/P EXPLANTATION OF RIGHT BREAST IMPLANT, RIGHT BREAST COMPLETE CAPSULECTOMY, RIGHT BREAST RECONSTRUCTION WITH DEEP INFERIOR EPIGASTRIC ARTERY  FLAP, INCARCERATED VENTRAL HERNIA REPAIR 7/25/2025.  Patient reports feeling well. Pain is well controlled. Atbx. and pain meds are well tolerated. Patient is wearing compression garments as directed. Denies fever, nausea or chills. Post op restrictions reinforced. No Wound Vac. 3 Drains in place, 1 right breast, 2 abdominal. Output as expected.  Reminded of follow up visit 8/4 @ 11:45am with Dr. Torrez. Patient will contact the office with any concerns.

## 2025-08-04 ENCOUNTER — OFFICE VISIT (OUTPATIENT)
Dept: SURGERY | Age: 40
End: 2025-08-04

## 2025-08-04 VITALS
BODY MASS INDEX: 23.12 KG/M2 | DIASTOLIC BLOOD PRESSURE: 72 MMHG | HEART RATE: 86 BPM | WEIGHT: 135.4 LBS | HEIGHT: 64 IN | SYSTOLIC BLOOD PRESSURE: 109 MMHG

## 2025-08-04 DIAGNOSIS — Z09 POSTOP CHECK: Primary | ICD-10-CM

## 2025-08-04 PROCEDURE — 99024 POSTOP FOLLOW-UP VISIT: CPT | Performed by: SURGERY

## 2025-08-11 ENCOUNTER — OFFICE VISIT (OUTPATIENT)
Dept: SURGERY | Age: 40
End: 2025-08-11

## 2025-08-11 VITALS
HEART RATE: 75 BPM | BODY MASS INDEX: 23.01 KG/M2 | WEIGHT: 134.8 LBS | HEIGHT: 64 IN | DIASTOLIC BLOOD PRESSURE: 66 MMHG | SYSTOLIC BLOOD PRESSURE: 98 MMHG

## 2025-08-11 DIAGNOSIS — Z09 POSTOP CHECK: Primary | ICD-10-CM

## 2025-08-11 PROCEDURE — 99024 POSTOP FOLLOW-UP VISIT: CPT | Performed by: SURGERY

## 2025-09-03 ENCOUNTER — OFFICE VISIT (OUTPATIENT)
Dept: SURGERY | Age: 40
End: 2025-09-03

## 2025-09-03 VITALS
WEIGHT: 137.2 LBS | DIASTOLIC BLOOD PRESSURE: 67 MMHG | BODY MASS INDEX: 23.42 KG/M2 | HEART RATE: 80 BPM | SYSTOLIC BLOOD PRESSURE: 111 MMHG | HEIGHT: 64 IN

## 2025-09-03 DIAGNOSIS — Z09 POSTOP CHECK: Primary | ICD-10-CM

## 2025-09-03 PROCEDURE — 99024 POSTOP FOLLOW-UP VISIT: CPT | Performed by: SURGERY

## (undated) DEVICE — DRESSING KIT INCISION MGMT 24X22 CM PREVENA RESTOR ARTH FRM

## (undated) DEVICE — SUTURE MCRYL SZ 4-0 L27IN ABSRB UD L19MM PS-2 1/2 CIR PRIM Y426H

## (undated) DEVICE — SUTURE PERMA-HAND SZ 2-0 L30IN NONABSORBABLE BLK L30MM FSL 679H

## (undated) DEVICE — PLASMABLADE PS210-030S 3.0S LOCK: Brand: PLASMABLADE™

## (undated) DEVICE — DRAIN SURG 15FR L3 16IN DIA47MM SIL RND HUBLESS FULL FLUT

## (undated) DEVICE — PREVENA PLUS INCISION MANAGEMENT SYSTEM: Brand: PREVENA PLUS™

## (undated) DEVICE — SYRINGE MED 10ML LUERLOCK TIP W/O SFTY DISP

## (undated) DEVICE — SPONGE,LAP,18"X18",DLX,XR,ST,5/PK,40/PK: Brand: MEDLINE

## (undated) DEVICE — SYRINGE 20ML LL S/C 50

## (undated) DEVICE — SUTURE PROL SZ 5-0 L18IN NONABSORBABLE BLU L13MM P-3 3/8 8698G

## (undated) DEVICE — RESERVOIR,SUCTION,100CC,SILICONE: Brand: MEDLINE

## (undated) DEVICE — GEL US 20GM NONIRRITATING OVERWRAPPED FILE PCH TRNSMIT

## (undated) DEVICE — SYRINGE IRRIG 60ML SFT PLIABLE BLB EZ TO GRP 1 HND USE W/

## (undated) DEVICE — STRIP,CLOSURE,WOUND,MEDI-STRIP,1/2X4: Brand: MEDLINE

## (undated) DEVICE — SUTURE PERMAHAND SZ 2-0 L18IN NONABSORBABLE BLK L26MM SH C012D

## (undated) DEVICE — CLIP HEMSTAT TI CHEVRON SHP INTLOK ATRAUM TEETH MICROCLP: Type: IMPLANTABLE DEVICE | Status: NON-FUNCTIONAL

## (undated) DEVICE — RETRACTOR SURG WIDE FLAT LO PROF LTWT PHOTONGUIDE

## (undated) DEVICE — PAD FOAM 11.75X7-7/8 IN RESTON LF

## (undated) DEVICE — PROTECTOR ULN NRV PUR FOAM HK LOOP STRP ANATOMICALLY

## (undated) DEVICE — HOOK RETRACT STERIL 12MM S STL BLNT E STAY LONE STAR

## (undated) DEVICE — APPLICATOR MEDICATED 26 CC SOLUTION HI LT ORNG CHLORAPREP

## (undated) DEVICE — TRAP FLUID

## (undated) DEVICE — PLASMABLADE X PS210-030S-LIGHT 3.0SL: Brand: PLASMABLADE™ X

## (undated) DEVICE — SPONGE,TONSIL,DBL STRNG,XRAY,MED,1",STRL: Brand: MEDLINE INDUSTRIES, INC.

## (undated) DEVICE — PIN SFTY L L2IN S STL FOR GRP HLD AND RET

## (undated) DEVICE — 3M™ TEGADERM™ CHG CHLORHEXIDINE GLUCONATE GEL PAD 1664, 25 EACH/CARTON, 4 CARTONS/CASE: Brand: 3M™ TEGADERM™

## (undated) DEVICE — DECANTER BAG 9": Brand: MEDLINE INDUSTRIES, INC.

## (undated) DEVICE — DRAPE MICSCP W54XL150IN W/ 4 BINOC GLS LENS LEICA

## (undated) DEVICE — GLOVE,SURG,SENSICARE,ALOE,LF,PF,7: Brand: MEDLINE

## (undated) DEVICE — GLOVE ORANGE PI 8 1/2   MSG9085

## (undated) DEVICE — STERILE PVP: Brand: MEDLINE INDUSTRIES, INC.

## (undated) DEVICE — PROVE COVER: Brand: UNBRANDED

## (undated) DEVICE — SUTURE 1 36 VIO MONO PDS PDP371T

## (undated) DEVICE — INTENDED USE FOR SURGICAL MARKING ON INTACT SKIN, ALSO PROVIDES A PERMANENT METHOD OF IDENTIFYING OBJECTS IN THE OPERATING ROOM: Brand: WRITESITE® PLUS MINI PREP RESISTANT MARKER

## (undated) DEVICE — COVER,MAYO STAND,XL,STERILE: Brand: MEDLINE

## (undated) DEVICE — GLOVE ORANGE PI 7 1/2   MSG9075

## (undated) DEVICE — ARGYLE FRAZIER SURGICAL SUCTION INSTRUMENT 8 FR/CH (2.7 MM): Brand: ARGYLE

## (undated) DEVICE — SHEET,DRAPE,53X77,STERILE: Brand: MEDLINE

## (undated) DEVICE — AGENT HEMSTAT 3GM OXIDIZED REGENERATED CELOS ABSRB FOR CONT (ORDER MULTIPLES OF 5EA)

## (undated) DEVICE — GARMENT,MEDLINE,DVT,INT,CALF,MED, GEN2: Brand: MEDLINE

## (undated) DEVICE — GLOVE SURG SZ 7 CRM LTX FREE POLYISOPRENE POLYMER BEAD ANTI

## (undated) DEVICE — SPEAR SURG TRIANG SHP HNDL PCH WECK-CEL

## (undated) DEVICE — GAUZE,SPONGE,4"X4",16PLY,XRAY,STRL,LF: Brand: MEDLINE

## (undated) DEVICE — TOWEL,OR,DSP,ST,BLUE,DLX,8/PK,10PK/CS: Brand: MEDLINE

## (undated) DEVICE — ELECTRODE ES AD PED L2.5IN TEF INSUL MOD NONCORDED BLDE TIP

## (undated) DEVICE — PAD,ABDOMINAL,5"X9",STERILE,LF,1/PK: Brand: MEDLINE INDUSTRIES, INC.

## (undated) DEVICE — SENSOR TISS OXMTR SM PTCH T. OX

## (undated) DEVICE — 3M™ STERI-DRAPE™ INSTRUMENT POUCH 1018: Brand: STERI-DRAPE™

## (undated) DEVICE — LOOP,VESSEL,MAXI,BLUE,2/PK,STERILE: Brand: MEDLINE

## (undated) DEVICE — SUTURE MONOCRYL SZ 3-0 L27IN ABSRB UD PS-2 3/8 CIR REV CUT NDL MCP427H

## (undated) DEVICE — GAUZE FLUFF 1 PLY: Brand: DEROYAL

## (undated) DEVICE — MEDICINE CUP, GRADUATED, STER: Brand: MEDLINE

## (undated) DEVICE — INTENDED FOR TISSUE SEPARATION, AND OTHER PROCEDURES THAT REQUIRE A SHARP SURGICAL BLADE TO PUNCTURE OR CUT.: Brand: BARD-PARKER ® CARBON RIB-BACK BLADES

## (undated) DEVICE — YANKAUER,OPEN TIP,W/O VENT,STERILE: Brand: MEDLINE INDUSTRIES, INC.

## (undated) DEVICE — CANNULA PERF L1.3IN TIP L2MM S STL POLYUR TB ARTOTMY BLB

## (undated) DEVICE — BRA SURG SUPP LG 38-40 IN ZIPPER

## (undated) DEVICE — SUTURE ETHILON SZ 8-0 L5IN NONABSORBABLE BLK L6.5MM BV130-5 2808G

## (undated) DEVICE — 450 ML BOTTLE OF 0.05% CHLORHEXIDINE GLUCONATE IN 99.95% STERILE WATER FOR IRRIGATION, USP AND APPLICATOR.: Brand: IRRISEPT ANTIMICROBIAL WOUND LAVAGE

## (undated) DEVICE — COUNTER NDL 40 COUNT HLD 70 NUM FOAM BLK SGL MAG W BLDE REMV

## (undated) DEVICE — BLANKET WRM W40.2XL55.9IN IORT LO BODY + MISTRAL AIR

## (undated) DEVICE — TOWEL,STOP FLAG GOLD N-W: Brand: MEDLINE

## (undated) DEVICE — Device

## (undated) DEVICE — CORD,CAUTERY,BIPOLAR,STERILE: Brand: MEDLINE

## (undated) DEVICE — NEEDLE HYPO 22GA L1.5IN BLK POLYPR HUB S STL REG BVL STR

## (undated) DEVICE — TUBE SUCT LAPSCP

## (undated) DEVICE — SUTURE MCRYL SZ 3-0 L27IN ABSRB UD L19MM PS-2 3/8 CIR PRIM Y427H

## (undated) DEVICE — SUTURE VICRYL + SZ 2-0 L18IN ABSRB VLT L26MM SH 1/2 CIR VCP775D

## (undated) DEVICE — SURGIFOAM SPNG SZ 100

## (undated) DEVICE — SYRINGE MED 5ML STD CLR PLAS LUERLOCK TIP N CTRL DISP

## (undated) DEVICE — GOWN,SIRUS,POLYRNF,BRTHSLV,LG,30/CS: Brand: MEDLINE

## (undated) DEVICE — PORT INSERTION: Brand: MEDLINE INDUSTRIES, INC.

## (undated) DEVICE — COVER,TABLE,HVY DUTY,EXT,77"X96",STRL: Brand: MEDLINE

## (undated) DEVICE — PROBE LOCALIZER W/DRAPE

## (undated) DEVICE — SUTURE PERMAHAND SZ 2-0 L30IN NONABSORBABLE BLK SILK W/O A305H

## (undated) DEVICE — LOOP VES W13MM THK09MM MINI RED SIL FLD REPELLENT

## (undated) DEVICE — CLIP SURG INT USE TI HEMSTAT MICROCLP: Type: IMPLANTABLE DEVICE | Status: NON-FUNCTIONAL

## (undated) DEVICE — CANNULA ANTERIOR CHAMBER 30G

## (undated) DEVICE — SUTURE PLN GUT SZ 5-0 L18IN ABSRB YELLOWISH TAN L13MM PC-1 1915G

## (undated) DEVICE — 1010 S-DRAPE TOWEL DRAPE 10/BX: Brand: STERI-DRAPE™

## (undated) DEVICE — Z DISCONTINUED USE 2272114 DRAPE SURG UTIL 26X15 IN W/ TAPE N INVASIVE MULTLYR DISP

## (undated) DEVICE — NG FEED TUBE, ENFIT, 5FR, 50CM, NO STYL: Brand: MEDLINE

## (undated) DEVICE — BLADE CLIPPER SURG SENSICLIP

## (undated) DEVICE — STANDARD HYPODERMIC NEEDLE,POLYPROPYLENE HUB: Brand: MONOJECT

## (undated) DEVICE — PLASTIC MAJOR: Brand: MEDLINE INDUSTRIES, INC.

## (undated) DEVICE — COVER XR CASS W21XL40IN UNIV ADH MICROSHIELD

## (undated) DEVICE — ELECTRODE PT RET AD L9FT HI MOIST COND ADH HYDRGEL CORDED

## (undated) DEVICE — COVER LT HNDL BLU PLAS

## (undated) DEVICE — CONNECTOR NEG THER PRESSURE Y VAC ASST VAC

## (undated) DEVICE — 6 ML SYRINGE LUER-LOCK TIP: Brand: MONOJECT

## (undated) DEVICE — SYRINGE MEDICAL 3ML CLEAR PLASTIC STANDARD NON CONTROL LUERLOCK TIP DISPOSABLE

## (undated) DEVICE — DRAPE,CHEST,FENES,15X10,STERIL: Brand: MEDLINE

## (undated) DEVICE — SUTURE PERMAHAND SZ 3-0 L18IN NONABSORBABLE BLK L26MM SH C013D

## (undated) DEVICE — APPLIER LIG CLP M L11IN TI STR RNG HNDL FOR 20 CLP DISP

## (undated) DEVICE — SUTURE PDS II SZ 0 L18IN ABSRB VLT L36MM CT-1 1/2 CIR Z740D

## (undated) DEVICE — DRAIN,WOUND,15FR,3/16,FULL-FLUTED: Brand: MEDLINE

## (undated) DEVICE — BLADE,CARBON-STEEL,15,STRL,DISPOSABLE,TB: Brand: MEDLINE

## (undated) DEVICE — AGENT HEMOSTATIC SURG ORIGINAL ABS 4X8IN LOOSE KNIT 12/CA

## (undated) DEVICE — SYSTEM SKIN CLOSURE 42CM DERMABOND PRINEO

## (undated) DEVICE — GLOVE SURG SZ 7 L12IN FNGR THK79MIL GRN LTX FREE

## (undated) DEVICE — SUTURE PDS II SZ 2-0 L27IN ABSRB UD FS-1 L24MM 3/8 CIR REV Z443H

## (undated) DEVICE — SUTURE ETHILON SZ 3-0 L18IN NONABSORBABLE BLK L24MM FS-1 3/8 663G

## (undated) DEVICE — SHEET,DRAPE,40X58,STERILE: Brand: MEDLINE

## (undated) DEVICE — DRESSING NEG PRESSURE INCISION MGMT SYS 90 CM KIT PREVENA +

## (undated) DEVICE — STAPLER SKIN H3.9MM WIRE DIA0.58MM CRWN 6.9MM 35 STPL ROT

## (undated) DEVICE — CLIP SM RED INTERN HMOCLP TITAN LIGATING

## (undated) DEVICE — WIPE INSTR W73XL73CM VISITEC

## (undated) DEVICE — 3M™ IOBAN™ 2 ANTIMICROBIAL INCISE DRAPE 6640EZ: Brand: IOBAN™ 2

## (undated) DEVICE — TUBING, SUCTION, 9/32" X 12', STRAIGHT: Brand: MEDLINE INDUSTRIES, INC.

## (undated) DEVICE — SUTURE PDS II SZ 2-0 L27IN ABSRB VLT L26MM CT-2 1/2 CIR Z333H

## (undated) DEVICE — SEALANT TISS 10 ML FIBRIN VISTASEAL

## (undated) DEVICE — BLANKET THER PED W25XL33IN HYPER/HYPOTHERMIA DISP

## (undated) DEVICE — SUTURE VCRL SZ 3-0 L27IN ABSRB UD L26MM SH 1/2 CIR J416H

## (undated) DEVICE — PAD DSG NEG PRSS W TBNG CLMP CONN SENSATRAC VAC

## (undated) DEVICE — 3M™ IOBAN™ 2 ANTIMICROBIAL INCISE DRAPE 6648EZ: Brand: IOBAN™ 2

## (undated) DEVICE — DRESSING,GAUZE,XEROFORM,CURAD,1"X8",ST: Brand: CURAD

## (undated) DEVICE — NEEDLE SPNL L3.5IN PNK HUB S STL REG WALL FIT STYL W/ QNCKE

## (undated) DEVICE — APPLIER CLP L9.375IN APER 2.1MM CLS L3.8MM 20 SM TI CLP

## (undated) DEVICE — BINDER ABD H12IN FOR 30-45IN WAIST UNIV 4 PNL PREM DSGN E

## (undated) DEVICE — SUTURE PERMA-HAND SZ 2-0 L30IN NONABSORBABLE BLK L26MM SH K833H

## (undated) DEVICE — PACK SURGICAL PROC CUSTOM TISSUE PERFUSION SYSTEM SPY ELITE

## (undated) DEVICE — GLOVE SURG SZ 75 L12IN FNGR THK79MIL GRN LTX FREE

## (undated) DEVICE — BANDAGE,GAUZE,BULKEE II,4.5"X4.1YD,STRL: Brand: MEDLINE

## (undated) DEVICE — SUTURE MONOCRYL STRATAFIX SPRL + SZ 3-0 L18IN ABSRB UD PS-2 SXMP1B107

## (undated) DEVICE — SUTURE PDS + SZ 2 0 L27IN ABSRB VLT L26MM CT 2 1 2 CIR PDP333H

## (undated) DEVICE — 3M™ TEGADERM™ TRANSPARENT FILM DRESSING FRAME STYLE, 1624W, 2-3/8 IN X 2-3/4 IN (6 CM X 7 CM), 100/CT 4CT/CASE: Brand: 3M™ TEGADERM™

## (undated) DEVICE — SYSTEM SKIN CLSR 22CM DERMBND PRINEO

## (undated) DEVICE — BLADE,CARBON-STEEL,10,STRL,DISPOSABLE,TB: Brand: MEDLINE

## (undated) DEVICE — CLEANER,CAUTERY TIP,2X2",STERILE: Brand: MEDLINE

## (undated) DEVICE — 1LYRTR 16FR10ML100%SIL UMS SNP: Brand: MEDLINE INDUSTRIES, INC.